# Patient Record
Sex: MALE | Race: WHITE | Employment: FULL TIME | URBAN - METROPOLITAN AREA
[De-identification: names, ages, dates, MRNs, and addresses within clinical notes are randomized per-mention and may not be internally consistent; named-entity substitution may affect disease eponyms.]

---

## 2017-06-14 ENCOUNTER — OFFICE VISIT (OUTPATIENT)
Dept: ENDOCRINOLOGY | Age: 32
End: 2017-06-14

## 2017-06-14 VITALS
HEIGHT: 66 IN | DIASTOLIC BLOOD PRESSURE: 91 MMHG | HEART RATE: 89 BPM | WEIGHT: 142.6 LBS | TEMPERATURE: 96.7 F | RESPIRATION RATE: 16 BRPM | BODY MASS INDEX: 22.92 KG/M2 | OXYGEN SATURATION: 99 % | SYSTOLIC BLOOD PRESSURE: 120 MMHG

## 2017-06-14 DIAGNOSIS — Z79.4 TYPE 2 DIABETES MELLITUS WITH HYPERGLYCEMIA, WITH LONG-TERM CURRENT USE OF INSULIN (HCC): ICD-10-CM

## 2017-06-14 DIAGNOSIS — E11.65 UNCONTROLLED TYPE 2 DIABETES MELLITUS WITH HYPERGLYCEMIA, WITH LONG-TERM CURRENT USE OF INSULIN (HCC): ICD-10-CM

## 2017-06-14 DIAGNOSIS — E78.00 HYPERCHOLESTEREMIA: ICD-10-CM

## 2017-06-14 DIAGNOSIS — I10 ESSENTIAL HYPERTENSION: ICD-10-CM

## 2017-06-14 DIAGNOSIS — Z79.4 UNCONTROLLED TYPE 2 DIABETES MELLITUS WITH HYPERGLYCEMIA, WITH LONG-TERM CURRENT USE OF INSULIN (HCC): ICD-10-CM

## 2017-06-14 DIAGNOSIS — E11.65 TYPE 2 DIABETES MELLITUS WITH HYPERGLYCEMIA, WITH LONG-TERM CURRENT USE OF INSULIN (HCC): Primary | ICD-10-CM

## 2017-06-14 DIAGNOSIS — Z79.4 TYPE 2 DIABETES MELLITUS WITH HYPERGLYCEMIA, WITH LONG-TERM CURRENT USE OF INSULIN (HCC): Primary | ICD-10-CM

## 2017-06-14 DIAGNOSIS — E11.65 TYPE 2 DIABETES MELLITUS WITH HYPERGLYCEMIA, WITH LONG-TERM CURRENT USE OF INSULIN (HCC): ICD-10-CM

## 2017-06-14 LAB — HBA1C MFR BLD HPLC: 10.4 %

## 2017-06-14 RX ORDER — LISINOPRIL 10 MG/1
10 TABLET ORAL DAILY
Qty: 90 TAB | Refills: 3 | Status: SHIPPED | OUTPATIENT
Start: 2017-06-14 | End: 2017-09-28 | Stop reason: SDUPTHER

## 2017-06-14 RX ORDER — PRAVASTATIN SODIUM 10 MG/1
10 TABLET ORAL
Qty: 30 TAB | Refills: 5 | Status: SHIPPED | OUTPATIENT
Start: 2017-06-14 | End: 2017-09-28 | Stop reason: SDUPTHER

## 2017-06-14 RX ORDER — METFORMIN HYDROCHLORIDE 1000 MG/1
1000 TABLET ORAL 2 TIMES DAILY WITH MEALS
Qty: 60 TAB | Refills: 5 | Status: SHIPPED | OUTPATIENT
Start: 2017-06-14 | End: 2017-09-28 | Stop reason: SDUPTHER

## 2017-06-14 NOTE — MR AVS SNAPSHOT
Visit Information Date & Time Provider Department Dept. Phone Encounter #  
 6/14/2017  8:45 AM Trixie Sanders MD [facility] [phone] [id number] Follow-up Instructions Return in about 3 months (around 9/14/2017). Upcoming Health Maintenance Date Due  
 EYE EXAM RETINAL OR DILATED Q1 10/29/1993 Pneumococcal 19-64 Medium Risk (1 of 1 - PPSV23) 10/29/2002 DTaP/Tdap/Td series (1 - Tdap) 10/29/2004 FOOT EXAM Q1 2/25/2016 MICROALBUMIN Q1 2/25/2016 LIPID PANEL Q1 2/25/2016 HEMOGLOBIN A1C Q6M 4/17/2017 INFLUENZA AGE 9 TO ADULT 8/1/2017 Allergies as of 6/14/2017  Review Complete On: 6/14/2017 By: Trixie Sanders MD  
 No Known Allergies Current Immunizations  Never Reviewed No immunizations on file. Not reviewed this visit You Were Diagnosed With   
  
 Codes Comments Type 2 diabetes mellitus with hyperglycemia, with long-term current use of insulin (HCC)    -  Primary ICD-10-CM: E11.65, Z79.4 ICD-9-CM: 250.00, 790.29, V58.67 Hypercholesteremia     ICD-10-CM: E78.00 ICD-9-CM: 272.0 Essential hypertension     ICD-10-CM: I10 
ICD-9-CM: 401.9 Vitals BP Pulse Temp Resp Height(growth percentile) Weight(growth percentile) (!) 120/91 (BP 1 Location: Left arm, BP Patient Position: Sitting) 89 96.7 °F (35.9 °C) (Oral) 16 5' 6\" (1.676 m) 142 lb 9.6 oz (64.7 kg) SpO2 BMI Smoking Status 99% 23.02 kg/m2 Never Smoker BMI and BSA Data Body Mass Index Body Surface Area 23.02 kg/m 2 1.74 m 2 Preferred Pharmacy Pharmacy Name Phone [facility] [address] [place] [address] [phone] Your Updated Medication List  
  
   
This list is accurate as of: 6/14/17  9:31 AM.  Always use your most recent med list.  
  
  
  
  
 glucose tablet Take 1 Tab by mouth as needed. insulin  unit/mL injection Commonly known as:  NovoLIN N  
 INJECT 30 UNITS IN THE MORNING AND 20 UNITS AT 9 PM  
  
 insulin regular 100 unit/mL injection Commonly known as:  NovoLIN R  
INJECT 8 UNITS BEFORE DINNER WITH SSI MAX UNITS DAILY: 53  
  
 insulin syringe-needle U-100 1/2 mL 31 gauge x 15/64\" Syrg Commonly known as:  BD INSULIN SYRINGE ULTRA-FINE  
1 Syringe by Does Not Apply route two (2) times a day. lisinopril 2.5 mg tablet Commonly known as:  Melissa Feil Take 1 Tab by mouth daily. metFORMIN 1,000 mg tablet Commonly known as:  GLUCOPHAGE Take 1 Tab by mouth two (2) times daily (with meals). pravastatin 10 mg tablet Commonly known as:  PRAVACHOL Take 1 Tab by mouth nightly. We Performed the Following AMB POC HEMOGLOBIN A1C [67240 CPT(R)] Follow-up Instructions Return in about 3 months (around 9/14/2017). Introducing [facility]! [employer] introduces H[employer] patient portal. Now you can access parts of your medical record, email your doctor's office, and request medication refills online. 1. In your internet browser, go to [email or web address] 2. Click on the First Time User? Click Here link in the Sign In box. You will see the New Member Sign Up page. 3. Enter your HackerEarth Access Code exactly as it appears below. You will not need to use this code after youve completed the sign-up process. If you do not sign up before the expiration date, you must request a new code. · Hacker[employer] Access Code: [email or web address] Expires: 9/12/2017  9:31 AM 
 
4. Enter the last four digits of your Social Security Number (xxxx) and Date of Birth (mm/dd/yyyy) as indicated and click Submit. You will be taken to the next sign-up page. 5. Create a Setera Communicationst ID. This will be your HackerEarth login ID and cannot be changed, so think of one that is secure and easy to remember. 6. Create a [employer] password. You can change your password at any time. 7. Enter your Password Reset Question and Answer. This can be used at a later time if you forget your password. 8. Enter your e-mail address. You will receive e-mail notification when new information is available in [address] 9. Click Sign Up. You can now view and download portions of your medical record. 10. Click the Download Summary menu link to download a portable copy of your medical information. If you have questions, please visit the Frequently Asked Questions section of the deltamethod website. Remember, deltamethod is NOT to be used for urgent needs. For medical emergencies, dial 911. Now available from your iPhone and Android! Please provide this summary of care documentation to your next provider. Your primary care clinician is listed as NONE. If you have any questions after today's visit, please call [phone].

## 2017-06-14 NOTE — PROGRESS NOTES
Reyes Arce is a 33 y.o. male here for   Chief Complaint   Patient presents with    Diabetes    Cholesterol Problem    Hypertension       Functional glucose monitor and record keeping system? - yes   Eye exam within last year? - no  Foot exam within last year? - no    1. Have you been to the ER, urgent care clinic since your last visit? Hospitalized since your last visit? -no    2. Have you seen or consulted any other health care providers outside of the [facility] since your last visit?   Include any pap smears or colon screening.- no      Lab Results   Component Value Date/Time    Hemoglobin A1c 12.3 02/25/2015 10:26 AM    Hemoglobin A1c (POC) 11.5 10/17/2016 08:46 AM       Wt Readings from Last 3 Encounters:   10/17/16 141 lb (64 kg)   08/23/16 165 lb (74.8 kg)   07/13/15 140 lb (63.5 kg)     Temp Readings from Last 3 Encounters:   10/17/16 97.8 °F (36.6 °C) (Oral)   08/23/16 98 °F (36.7 °C)   07/13/15 98.6 °F (37 °C)     BP Readings from Last 3 Encounters:   10/17/16 135/87   08/23/16 101/58   07/13/15 115/71     Pulse Readings from Last 3 Encounters:   10/17/16 94   08/23/16 93   07/13/15 66

## 2017-09-12 ENCOUNTER — HOSPITAL ENCOUNTER (EMERGENCY)
Age: 32
Discharge: HOME | End: 2017-09-12
Attending: EMERGENCY MEDICINE
Payer: SUBSIDIZED

## 2017-09-12 ENCOUNTER — APPOINTMENT (OUTPATIENT)
Dept: GENERAL RADIOLOGY | Age: 32
End: 2017-09-12
Attending: EMERGENCY MEDICINE
Payer: SUBSIDIZED

## 2017-09-12 VITALS
BODY MASS INDEX: 23.74 KG/M2 | WEIGHT: 147.71 LBS | RESPIRATION RATE: 16 BRPM | SYSTOLIC BLOOD PRESSURE: 139 MMHG | TEMPERATURE: 98 F | HEART RATE: 94 BPM | DIASTOLIC BLOOD PRESSURE: 97 MMHG | HEIGHT: 66 IN | OXYGEN SATURATION: 100 %

## 2017-09-12 DIAGNOSIS — M79.605 LEG PAIN, BILATERAL: Primary | ICD-10-CM

## 2017-09-12 DIAGNOSIS — M79.604 LEG PAIN, BILATERAL: Primary | ICD-10-CM

## 2017-09-12 PROCEDURE — 73590 X-RAY EXAM OF LOWER LEG: CPT

## 2017-09-12 PROCEDURE — 99282 EMERGENCY DEPT VISIT SF MDM: CPT

## 2017-09-12 RX ORDER — CYCLOBENZAPRINE HCL 10 MG
10 TABLET ORAL
Qty: 20 TAB | Refills: 0 | Status: SHIPPED | OUTPATIENT
Start: 2017-09-12

## 2017-09-12 NOTE — ED PROVIDER NOTES
HPI Comments: 33 y.o. male with past medical history significant for DM who presents from home with chief complaint of B/L LE pain. The pt c/o B/L LE pain  that started 3 months ago without injury. The pain worsens at night and presents as bone pain and a calf cramping sensation. The pt has been drinking fluids. There are no other acute medical concerns at this time. Social hx: nonsmoker, no EtOH use  PCP: None  Note written by Jen Crum, as dictated by Valery Paula MD 12:50 PM        The history is provided by the patient. No  was used. Past Medical History:   Diagnosis Date    [facility] ([facility]     Type 2 diabetes mellitus ([facility]        No past surgical history on file. No family history on file. Social History     Social History    Marital status:      Spouse name: N/A    Number of children: N/A    Years of education: N/A     Occupational History    Not on file. Social History Main Topics    Smoking status: Never Smoker    Smokeless tobacco: Not on file    Alcohol use Yes    Drug use: Not on file    Sexual activity: Not on file     Other Topics Concern    Not on file     Social History Narrative    ** Merged History Encounter **              ALLERGIES: Review of patient's allergies indicates no known allergies. Review of Systems   Constitutional: Negative. Negative for appetite change, fever and unexpected weight change. HENT: Negative. Negative for ear pain, hearing loss, nosebleeds, rhinorrhea, sore throat and trouble swallowing. Respiratory: Negative. Negative for cough, chest tightness and shortness of breath. Cardiovascular: Negative. Negative for chest pain and palpitations. Gastrointestinal: Negative. Negative for abdominal distention, abdominal pain, blood in stool and vomiting. Endocrine: Negative. Genitourinary: Negative for dysuria and hematuria. Musculoskeletal: Negative.   Negative for back pain and myalgias. B/L LE pain   Skin: Negative. Negative for rash. Allergic/Immunologic: Negative. Neurological: Negative. Negative for dizziness, syncope, weakness and numbness. Hematological: Negative. Psychiatric/Behavioral: Negative. All other systems reviewed and are negative. Vitals:    09/12/17 1052   BP: (!) 139/97   Pulse: 94   Resp: 16   Temp: 98 °F (36.7 °C)   SpO2: 100%   Weight: 67 kg (147 lb 11.3 oz)   Height: 5' 6\" (1.676 m)            Physical Exam   Constitutional: He is oriented to person, place, and time. He appears well-developed and well-nourished. No distress. HENT:   Head: Normocephalic and atraumatic. Right Ear: External ear normal.   Left Ear: External ear normal.   Nose: Nose normal.   Mouth/Throat: Oropharynx is clear and moist.   Eyes: Conjunctivae and EOM are normal. Pupils are equal, round, and reactive to light. Neck: Normal range of motion. Neck supple. No JVD present. No thyromegaly present. Cardiovascular: Normal rate, regular rhythm, normal heart sounds and intact distal pulses. No murmur heard. Pulmonary/Chest: Effort normal and breath sounds normal. No respiratory distress. He has no wheezes. He has no rales. Abdominal: Soft. Bowel sounds are normal. He exhibits no distension. There is no tenderness. Musculoskeletal: Normal range of motion. He exhibits no edema. B/L calf discomfort with palpation without swelling, erythema, or weakness;  neurovascularly intact; Bone structure grossly intact; No joint tenderness or swelling;   Neurological: He is alert and oriented to person, place, and time. No cranial nerve deficit. Skin: Skin is warm and dry. No rash noted. Psychiatric: He has a normal mood and affect. His behavior is normal. Thought content normal.   Nursing note and vitals reviewed.    Note written by Riya OfficerJen, as dictated by Darryn Horner MD 12:51 PM      MDM  Number of Diagnoses or Management Options  Leg pain, bilateral:   Diagnosis management comments: Assessment: leg cramps. Follow up with [facility]    ED Course       Procedures    1:02 PM   L and R tibfib xrays are negative. Will send home with Flexeril.

## 2017-09-12 NOTE — DISCHARGE INSTRUCTIONS
We hope that we have addressed all of your medical concerns. The examination and treatment you received in the Emergency Department were for an emergent problem and were not intended as complete care. It is important that you follow up with your healthcare provider(s) for ongoing care. If your symptoms worsen or do not improve as expected, and you are unable to reach your usual health care provider(s), you should return to the Emergency Department. Today's healthcare is undergoing tremendous change, and patient satisfaction surveys are one of the many tools to assess the quality of medical care. You may receive a survey from the [employer] regarding your experience in the Emergency Department. I hope that your experience has been completely positive, particularly the medical care that I provided. As such, please participate in the survey; anything less than excellent does not meet my expectations or intentions. Formerly [facility] and [address] [place] [address] participate in nationally recognized quality of care measures. If your blood pressure is greater than 120/80, as reported below, we urge that you seek medical care to address the potential of high blood pressure, commonly known as hypertension. Hypertension can be hereditary or can be caused by certain medical conditions, pain, stress, or \"white coat syndrome. \"       Please make an appointment with your health care provider(s) for follow up of your Emergency Department visit. VITALS:   Patient Vitals for the past 8 hrs:   Temp Pulse Resp BP SpO2   09/12/17 1052 98 °F (36.7 °C) 94 16 (!) 139/97 100 %          Thank you for allowing us to provide you with medical care today. We realize that you have many choices for your emergency care needs. Please choose us in the future for any continued health care needs. Regards,           Angel Alexandre, [address] [facility].   Office: [phone]            No results found for this or any previous visit (from the past 24 hour(s)). Xr Tib/fib Lt    Result Date: 9/12/2017  EXAM:  XR TIB/FIB LT INDICATION: Bilateral calf pain for 3 months that is worse at night. COMPARISON: None. FINDINGS: AP and lateral  views of the left tibia and fibula demonstrate no fracture or other acute osseous, articular or soft tissue abnormality. IMPRESSION: No acute abnormality. Xr Tib/fib Rt    Result Date: 9/12/2017  EXAM:  XR TIB/FIB RT INDICATION:  Bilateral calf pain for 3 months that is worse at night. COMPARISON: None. FINDINGS: AP and lateral  views of the right tibia and fibula demonstrate no fracture or other acute osseous, articular or soft tissue abnormality. IMPRESSION: No acute abnormality.

## 2017-09-28 ENCOUNTER — OFFICE VISIT (OUTPATIENT)
Dept: ENDOCRINOLOGY | Age: 32
End: 2017-09-28

## 2017-09-28 VITALS
BODY MASS INDEX: 23.56 KG/M2 | HEART RATE: 92 BPM | TEMPERATURE: 97.4 F | SYSTOLIC BLOOD PRESSURE: 125 MMHG | DIASTOLIC BLOOD PRESSURE: 85 MMHG | RESPIRATION RATE: 16 BRPM | OXYGEN SATURATION: 100 % | HEIGHT: 66 IN | WEIGHT: 146.6 LBS

## 2017-09-28 DIAGNOSIS — Z79.4 UNCONTROLLED TYPE 2 DIABETES MELLITUS WITH HYPERGLYCEMIA, WITH LONG-TERM CURRENT USE OF INSULIN (HCC): ICD-10-CM

## 2017-09-28 DIAGNOSIS — E11.65 TYPE 2 DIABETES MELLITUS WITH HYPERGLYCEMIA, WITH LONG-TERM CURRENT USE OF INSULIN (HCC): Primary | ICD-10-CM

## 2017-09-28 DIAGNOSIS — I10 ESSENTIAL HYPERTENSION: ICD-10-CM

## 2017-09-28 DIAGNOSIS — E11.49 DIABETIC NEUROPATHY WITH NEUROLOGIC COMPLICATION (HCC): ICD-10-CM

## 2017-09-28 DIAGNOSIS — E11.65 TYPE 2 DIABETES MELLITUS WITH HYPERGLYCEMIA, WITH LONG-TERM CURRENT USE OF INSULIN (HCC): ICD-10-CM

## 2017-09-28 DIAGNOSIS — E78.00 HYPERCHOLESTEREMIA: ICD-10-CM

## 2017-09-28 DIAGNOSIS — Z79.4 TYPE 2 DIABETES MELLITUS WITH HYPERGLYCEMIA, WITH LONG-TERM CURRENT USE OF INSULIN (HCC): ICD-10-CM

## 2017-09-28 DIAGNOSIS — Z79.4 TYPE 2 DIABETES MELLITUS WITH HYPERGLYCEMIA, WITH LONG-TERM CURRENT USE OF INSULIN (HCC): Primary | ICD-10-CM

## 2017-09-28 DIAGNOSIS — E11.40 DIABETIC NEUROPATHY WITH NEUROLOGIC COMPLICATION (HCC): ICD-10-CM

## 2017-09-28 DIAGNOSIS — E11.65 UNCONTROLLED TYPE 2 DIABETES MELLITUS WITH HYPERGLYCEMIA, WITH LONG-TERM CURRENT USE OF INSULIN (HCC): ICD-10-CM

## 2017-09-28 LAB — HBA1C MFR BLD HPLC: 9.7 %

## 2017-09-28 RX ORDER — LISINOPRIL 10 MG/1
10 TABLET ORAL DAILY
Qty: 90 TAB | Refills: 3 | Status: SHIPPED | OUTPATIENT
Start: 2017-09-28

## 2017-09-28 RX ORDER — PRAVASTATIN SODIUM 10 MG/1
10 TABLET ORAL
Qty: 90 TAB | Refills: 3 | Status: SHIPPED | OUTPATIENT
Start: 2017-09-28

## 2017-09-28 RX ORDER — GABAPENTIN 300 MG/1
300 CAPSULE ORAL
Qty: 90 CAP | Refills: 3 | Status: SHIPPED | OUTPATIENT
Start: 2017-09-28

## 2017-09-28 RX ORDER — METFORMIN HYDROCHLORIDE 1000 MG/1
1000 TABLET ORAL 2 TIMES DAILY WITH MEALS
Qty: 180 TAB | Refills: 3 | Status: SHIPPED | OUTPATIENT
Start: 2017-09-28 | End: 2019-10-02 | Stop reason: SDUPTHER

## 2017-09-28 NOTE — PATIENT INSTRUCTIONS
Check blood sugars before breakfast and dinner,bedtime    If the bedtime sugars are less than 100 ,eat a 15 gm snack. Weight and diet control. Continue Metformin    Novolin N cloudy insulin 30 units in AM , 20 units at  PM    Novolin R clear insulin ( fast acting) 8 units before breakfast and dinner. Novolin R as needed if glucose is    Blood sugar  Breakfast/Lunch/Dinner        130-200  Add  3  Units       201-250  Add  6 Units       251-300  Add  9 Units       301-350  Add 12  Units        351-400  Add 15  Units       Continue metformin       Dolor neuropático: Instrucciones de cuidado - [ Neuropathic Pain: Care Instructions ]  Instrucciones de cuidado  El dolor neuropático es causado por presión o daño en los nervios. A menudo se lo llama simplemente dolor nervioso. Algunas personas sienten michael tipo de dolor todo [place]. Para otras, aparece y desaparece. La diabetes, la culebrilla o lynda lesión pueden causar dolor nervioso. Muchas personas dicen que el dolor que sienten es tiffanie, ardiente o punzante. Joseluis algunas personas lo sienten alvarado un dolor sordo. En algunos casos, puede hacer que benito piel esté muy sensible. De modo que el hecho de que lo toquen, presión y otras sensaciones que antes no le dolían ahora sí le duelen. Es importante que sepa que esta clase de dolor es real y puede afectar benito calidad de elliot. Es Sangeeta Sorrow importante que sepa que se puede ayudar con tratamiento. El tratamiento incluye analgésicos (medicamentos para el dolor), ejercicio y fisioterapia. Los medicamentos pueden ayudar a reducir la cantidad de señales de dolor que recorren los nervios. [place] puede hacer que las zonas doloridas caleb menos sensibles. También pueden ayudarle a dormir mejor y a mejorar benito estado de ánimo. Joseluis los medicamentos son solamente lynda parte de un tratamiento exitoso. La mayoría de las personas se benefician con más de lynda clase de [place].  Benito médico podría recomendarle que pruebe la terapia cognitivo-conductual y el manejo del estrés. O, si es necesario, usted podría optar por tratar de dejar de fumar, reducir benito presión arterial o controlar mejor henrietta niveles de azúcar en la carleen. Estas clases de cambios saludables también pueden tener un [place]. Si usted deep que benito tratamiento no está dando resultado, hable con benito médico. Y asegúrese de decirle a benito médico si piensa que pudiera estar deprimido o ansioso. Estos son problemas comunes que también pueden tratarse. La atención de seguimiento es lynda parte clave de benito tratamiento y seguridad. Asegúrese de hacer y acudir a todas las citas, y llame a benito médico si está teniendo problemas. También es lynda buena idea saber los resultados de los exámenes y mantener lynda lista de los medicamentos que bhavna. Cómo puede cuidarse en el hogar? · Sea jonathan con los medicamentos. Ruby y siga todas las instrucciones de la [place]. ¨ Si el médico le recetó un analgésico (medicamento para el dolor), tómelo según las indicaciones. ¨ Si no está tomando un analgésico recetado, pregúntele al médico si puede josef adrienne de [employer]. · Deje las tareas difíciles para los días en que tenga menos dolor. Alterne las tareas difíciles con tareas fáciles. Y recuerde josef descansos. · Relájese y reduzca el estrés. Karthikeyan vez quiera probar la respiración profunda o la meditación. Estas pueden ayudar. · Manténgase en movimiento. El ejercicio suave a diario puede ayudar a reducir el dolor. Benito médico o fisioterapeuta pueden decirle qué tipo de ejercicio le conviene a usted. [place] puede incluir caminar, nadar y usar lynda [place]. También puede incluir estiramientos y ejercicios de amplitud de [place]. · Pruebe con calor, compresas frías y masajes. · Duerma lo suficiente. El dolor cece puede hacerle sentir más cansado. Si el dolor le da dificultades para dormir, hable con benito médico.  · Piense de [place] positiva. Henrietta pensamientos pueden afectar benito dolor.  Pattie Cohen divertidas que lo distraigan del dolor. Cherelle lynda película, ruby un libro, escuche música o pase tiempo con un amigo. · Mantenga un diario de benito dolor. Trate de anotar la intensidad de benito dolor y cómo se siente. También trate de estar atento y anotar de qué manera christian estados de ánimo, pensamientos, sueño, actividades y medicamentos afectan el dolor. Estas notas pueden ayudarles a usted y a benito médico a encontrar las mejores maneras de tratar benito dolor. Cómo reducir el estreñimiento causado por los analgésicos  Los analgésicos con frecuencia causan estreñimiento. Para reducir el estreñimiento:  · Incluya en benito alimentación frutas, verduras, frijoles (habichuelas) y granos integrales todos los días. Estos alimentos son ricos en fibra. · Carmela abundantes líquidos, los suficientes alvarado para que benito orina sea de color amarillo kelin o transparente alvarado el agua. Si tiene [employer] [facility] [employer], del corazón o del hígado y tiene que [place]'s líquidos, hable con benito médico antes de aumentar benito consumo. · Gris algo de ejercicio todos los ana. Aumente el tiempo en forma gradual hasta entre 30 y 61 minutos al día, abigail 5 o más días a la semana. · Si es necesario, tome un suplemento de Erica, alvarado [employer] o Metamucil, todos los [address] [place] y siga todas las indicaciones de la [place]. · Fije un horario todos los días para evacuar el intestino. Lynda rutina diaria puede ayudar. Tómese benito tiempo y no se esfuerce cuando esté evacuando. · Pregúntele a benito médico si debe josef un laxante. El objetivo es tener lynda evacuación del intestino sin problemas cada 1 o 2 días. No espere más de 3 días para tratar el estreñimiento. Cuándo debe pedir ayuda? Llame a benito médico ahora mismo o busque atención médica inmediata si:  · Se siente clair, ansioso o desesperanzado por más de [address] [facility]. [place] podría significar que está deprimido. La depresión es común [employer] personas que tienen mucho dolor. Joseluis puede tratarse.   · Tiene problemas con la evacuación intestinal, tales alvarado:  ¨ No ha tenido evacuaciones intestinales en 3 días. ¨ Hay carleen en la lawanda anal, las heces o el papel higiénico.  ¨ Tiene diarrea por más de 24 horas. Preste especial atención a los cambios en benito alcon y asegúrese de comunicarse con benito médico si:  · Benito dolor empeora. · No puede dormir debido al dolor. · Se siente muy preocupado o ansioso por benito dolor. · Tiene problemas para josef christian analgésicos. · Tiene inquietudes con respecto al analgésico o a christian efectos secundarios. · Tiene vómito o retortijones por más de 2 horas. Dónde puede encontrar más información en inglés? Caty Spencer a http://tera-barbara.info/. Homar De Souza Y679 en la búsqueda para aprender más acerca de \"Dolor neuropático: Instrucciones de cuidado - [ Neuropathic Pain: Care Instructions ]. \"  Revisado: 14 octubre, 2016  Versión del contenido: 11.3  © 1558-7231 Healthwise, Incorporated. Las instrucciones de cuidado fueron adaptadas bajo licencia por [employer] (which disclaims liability or warranty for this information). Si usted tiene [place] afección médica o sobre estas instrucciones, siempre pregunte a benito profesional de alcon. Healthwise, Incorporated niega toda garantía o responsabilidad por benito uso de esta información.

## 2017-09-28 NOTE — PROGRESS NOTES
Luzmaria Kirkland is a 33 y.o. male here for   Chief Complaint   Patient presents with    Diabetes       Functional glucose monitor and record keeping system? - yes, sometimes  Eye exam within last year? - no  Foot exam within last year? - Sept 2017     1. Have you been to the ER, urgent care clinic since your last visit? Hospitalized since your last visit? -[facility] for leg pains on 9/12/17 (in [place])    2. Have you seen or consulted any other health care providers outside of the [address] [place] [address] since your last visit?   Include any pap smears or colon screening.-no      Lab Results   Component Value Date/Time    Hemoglobin A1c 12.3 02/25/2015 10:26 AM    Hemoglobin A1c (POC) 10.4 06/14/2017 08:52 AM       Wt Readings from Last 3 Encounters:   09/12/17 147 lb 11.3 oz (67 kg)   06/14/17 142 lb 9.6 oz (64.7 kg)   10/17/16 141 lb (64 kg)     Temp Readings from Last 3 Encounters:   09/12/17 98 °F (36.7 °C)   06/14/17 96.7 °F (35.9 °C) (Oral)   10/17/16 97.8 °F (36.6 °C) (Oral)     BP Readings from Last 3 Encounters:   09/12/17 (!) 139/97   06/14/17 (!) 120/91   10/17/16 135/87     Pulse Readings from Last 3 Encounters:   09/12/17 94   06/14/17 89   10/17/16 94

## 2017-09-28 NOTE — PROGRESS NOTES
Marycarmen Araya MD          Patient Information  Date:9/28/2017  Name : Rom Vargas 33 y.o.     YOB: 1985         Referred by: Maverick Rosas NP         Chief Complaint   Patient presents with    Diabetes       History of Present Illness: Rom Vargas is a 33 y.o. male here for follow up of  Type 2 Diabetes Mellitus. He went to ER with calf cramping , also has tingling , pins and needles  Stopped ETOH recently   He is requesting flexeril which was given in ED    No log      He has no blurring of his vision. Has a strong family history of type 2 diabetes. Wt Readings from Last 3 Encounters:   09/28/17 146 lb 9.6 oz (66.5 kg)   09/12/17 147 lb 11.3 oz (67 kg)   06/14/17 142 lb 9.6 oz (64.7 kg)       BP Readings from Last 3 Encounters:   09/28/17 125/85   09/12/17 (!) 139/97   06/14/17 (!) 120/91           Past Medical History:   Diagnosis Date    Diabetes ([facility] [address]     Type 2 diabetes mellitus (HCC)      Current Outpatient Prescriptions   Medication Sig    cyclobenzaprine (FLEXERIL) 10 mg tablet Take 1 Tab by mouth nightly.  insulin regular (NOVOLIN R) 100 unit/mL injection INJECT 8 UNITS BEFORE DINNER WITH SSI MAX UNITS DAILY: 53    insulin NPH (NOVOLIN N) 100 unit/mL injection INJECT 30 UNITS IN THE MORNING AND 20 UNITS AT 9 PM    insulin syringe-needle U-100 (BD INSULIN SYRINGE ULTRA-FINE) 1/2 mL 31 x 15/64\" syrg 1 Syringe by Does Not Apply route two (2) times a day.  pravastatin (PRAVACHOL) 10 mg tablet Take 1 Tab by mouth nightly.  lisinopril (PRINIVIL, ZESTRIL) 10 mg tablet Take 1 Tab by mouth daily.  gabapentin (NEURONTIN) 300 mg capsule Take 1 Cap by mouth nightly.  metFORMIN (GLUCOPHAGE) 1,000 mg tablet Take 1 Tab by mouth two (2) times daily (with meals).  glucose tablet Take 1 Tab by mouth as needed. No current facility-administered medications for this visit.       No Known Allergies      Review of Systems:  - Constitutional Symptoms: no fevers, no chills  - Eyes: no blurry vision no double vision  - Cardiovascular: no chest pain ,no palpitations  - Respiratory: no cough no shortness of breath  - Integumentary: no rashes  - Neurological: no numbness, tingling, no  headaches  -     Physical Examination:   Blood pressure 125/85, pulse 92, temperature 97.4 °F (36.3 °C), temperature source Oral, resp. rate 16, height 5' 6\" (1.676 m), weight 146 lb 9.6 oz (66.5 kg), SpO2 100 %. Estimated body mass index is 23.66 kg/(m^2) as calculated from the following:    Height as of this encounter: 5' 6\" (1.676 m). -   Weight as of this encounter: 146 lb 9.6 oz (66.5 kg). - General: pleasant, no distress, good eye contact  - HEENT: no pallor, no periorbital edema, EOMI  - Neck: supple, no thyromegaly  - Cardiovascular: regular, normal rate, normal S1 and S2  - Respiratory: clear to auscultation bilaterally  - Gastrointestinal: soft, nontender, nondistended,  BS +  - Musculoskeletal: :no edema,   - Neurological:alert and oriented  - Psychiatric: normal mood and affect  - Skin: color, texture, turgor normal.       Data Reviewed:     [] Glucose records reviewed. [] See glucose records for details (to be scanned). [] A1C  [x] Reviewed labs    Lab Results   Component Value Date/Time    Hemoglobin A1c 12.3 02/25/2015 10:26 AM    Hemoglobin A1c 12.8 05/06/2014 01:50 PM    Glucose 256 08/23/2016 10:12 AM    Glucose (POC) 184 08/23/2016 11:45 AM    Glucose  10/17/2016 08:46 AM    Microalbumin/Creat ratio (mg/g creat) 45 05/06/2014 09:30 PM    Microalbumin,urine random 2.22 05/06/2014 09:30 PM    LDL, calculated 144 02/25/2015 10:26 AM    Creatinine 0.53 08/23/2016 10:12 AM      Lab Results   Component Value Date/Time    ALT (SGPT) 23 02/25/2015 10:26 AM    AST (SGOT) 13 02/25/2015 10:26 AM    Alk.  phosphatase 76 02/25/2015 10:26 AM    Bilirubin, total 0.7 02/25/2015 10:26 AM     Lab Results Component Value Date/Time    GFR est AA >60 08/23/2016 10:12 AM    GFR est non-AA >60 08/23/2016 10:12 AM    Creatinine 0.53 08/23/2016 10:12 AM    BUN 13 08/23/2016 10:12 AM    Sodium 138 08/23/2016 10:12 AM    Potassium 3.8 08/23/2016 10:12 AM    Chloride 100 08/23/2016 10:12 AM    CO2 24 08/23/2016 10:12 AM           Assessment/Plan:     1. Type 2 Diabetes Mellitus ,uncontrolled  Lab Results   Component Value Date/Time    Hemoglobin A1c 12.3 02/25/2015 10:26 AM    Hemoglobin A1c (POC) 9.7 09/28/2017 09:19 AM    Improved     NPH 30 units in the morning and 20 units at bedtime  Novolin R 8 units before Breakfast and dinner,cost is playing a role  Continue metformin  Advised to check glucose 2  times daily  Annual eye exam,microalbumin    2. HTN : Continue current therapy     3. Hyperlipidemia - statin    4 Neuropathy - due to ETOH + DM   Need labs and since he has no insurance he is hesitating to get labs   MVI ,banana, yogurt , gabapentin   Stop Flexeril     Thank you for allowing me to participate in the care of this patient.     Alex Nur MD

## 2017-09-28 NOTE — MR AVS SNAPSHOT
Visit Information Date & Time Provider Department Dept. Phone Encounter #  
 9/28/2017  8:45 AM Gilma Scott MD [facility] [place] [facility] [phone] [id number] Follow-up Instructions Return in about 3 months (around 12/28/2017). Upcoming Health Maintenance Date Due  
 EYE EXAM RETINAL OR DILATED Q1 10/29/1993 Pneumococcal 19-64 Medium Risk (1 of 1 - PPSV23) 10/29/2002 DTaP/Tdap/Td series (1 - Tdap) 10/29/2004 FOOT EXAM Q1 2/25/2016 MICROALBUMIN Q1 2/25/2016 LIPID PANEL Q1 2/25/2016 INFLUENZA AGE 9 TO ADULT 8/1/2017 HEMOGLOBIN A1C Q6M 12/14/2017 Allergies as of 9/28/2017  Review Complete On: 9/28/2017 By: France John LPN No Known Allergies Current Immunizations  Never Reviewed No immunizations on file. Not reviewed this visit You Were Diagnosed With   
  
 Codes Comments Type 2 diabetes mellitus with hyperglycemia, with long-term current use of insulin (HCC)    -  Primary ICD-10-CM: E11.65, Z79.4 ICD-9-CM: 250.00, 790.29, V58.67 Hypercholesteremia     ICD-10-CM: E78.00 ICD-9-CM: 272.0 Essential hypertension     ICD-10-CM: I10 
ICD-9-CM: 401.9 Diabetic neuropathy with neurologic complication (HCC)     NYN-13-AV: E11.40, E11.49 
ICD-9-CM: 250.60, 357.2 Vitals BP Pulse Temp Resp Height(growth percentile) Weight(growth percentile) 125/85 (BP 1 Location: Left arm, BP Patient Position: Sitting) 92 97.4 °F (36.3 °C) (Oral) 16 5' 6\" (1.676 m) 146 lb 9.6 oz (66.5 kg) SpO2 BMI Smoking Status 100% 23.66 kg/m2 Never Smoker Vitals History BMI and BSA Data Body Mass Index Body Surface Area  
 23.66 kg/m 2 1.76 m 2 Preferred Pharmacy Pharmacy Name Phone [facility] [address] [place] [address] [place] [phone] Your Updated Medication List  
  
   
This list is accurate as of: 9/28/17  9:46 AM.  Always use your most recent med list.  
 cyclobenzaprine 10 mg tablet Commonly known as:  FLEXERIL Take 1 Tab by mouth nightly. glucose tablet Take 1 Tab by mouth as needed. insulin  unit/mL injection Commonly known as:  NovoLIN N  
INJECT 30 UNITS IN THE MORNING AND 20 UNITS AT 9 PM  
  
 insulin regular 100 unit/mL injection Commonly known as:  NovoLIN R  
INJECT 8 UNITS BEFORE DINNER WITH SSI MAX UNITS DAILY: 53  
  
 insulin syringe-needle U-100 1/2 mL 31 gauge x 15/64\" Syrg Commonly known as:  BD INSULIN SYRINGE ULTRA-FINE  
1 Syringe by Does Not Apply route two (2) times a day. lisinopril 10 mg tablet Commonly known as:  Valene Pencil Take 1 Tab by mouth daily. metFORMIN 1,000 mg tablet Commonly known as:  GLUCOPHAGE Take 1 Tab by mouth two (2) times daily (with meals). pravastatin 10 mg tablet Commonly known as:  PRAVACHOL Take 1 Tab by mouth nightly. We Performed the Following AMB POC HEMOGLOBIN A1C [68145 CPT(R)] Follow-up Instructions Return in about 3 months (around 12/28/2017). To-Do List   
 09/28/2017 Lab:  CBC WITH AUTOMATED DIFF   
  
 09/28/2017 Lab:  METABOLIC PANEL, BASIC Patient Instructions Check blood sugars before breakfast and dinner,bedtime If the bedtime sugars are less than 100 ,eat a 15 gm snack. Weight and diet control. Continue Metformin Novolin N cloudy insulin 30 units in AM , 20 units at  PM 
 
Novolin R clear insulin ( fast acting) 8 units before breakfast and dinner. Novolin R as needed if glucose is Blood sugar  Breakfast/Lunch/Dinner 130-200  Add  3  Units 201-250  Add  6 Units 251-300  Add  9 Units 301-350  Add 12  Units 351-400  Add 15  Units Continue metformin Dolor neuropático: Instrucciones de cuidado - [ Neuropathic Pain: Care Instructions ] Instrucciones de cuidado El dolor neuropático es causado por presión o daño en los nervios. A menudo se lo llama simplemente dolor nervioso. Algunas personas sienten michael tipo de dolor todo [place]. Para otras, aparece y desaparece. La diabetes, la culebrilla o lynda lesión pueden causar dolor nervioso. Muchas personas dicen que el dolor que sienten es tiffanie, ardiente o punzante. Joseluis algunas personas lo sienten alvarado un dolor sordo. En algunos casos, puede hacer que benito piel esté muy sensible. De modo que el hecho de que lo toquen, presión y otras sensaciones que antes no le dolían ahora sí le duelen. Es importante que sepa que esta clase de dolor es real y puede afectar benito calidad de elliot. Es [place] importante que sepa que se puede ayudar con tratamiento. El tratamiento incluye analgésicos (medicamentos para el dolor), ejercicio y fisioterapia. Los medicamentos pueden ayudar a reducir la cantidad de señales de dolor que recorren los nervios. Hamer puede hacer que las zonas doloridas caleb menos sensibles. También pueden ayudarle a dormir mejor y a mejorar benito estado de ánimo. Joseluis los medicamentos son solamente lynda parte de un tratamiento exitoso. La mayoría de las personas se benefician con más de lynda clase de [place]. Benito médico podría recomendarle que pruebe la terapia cognitivo-conductual y el manejo del estrés. O, si es necesario, usted podría optar por tratar de dejar de fumar, reducir benito presión arterial o controlar mejor christian niveles de azúcar en la carleen. Estas clases de cambios saludables también pueden tener un [place]. Si usted deep que benito tratamiento no está dando resultado, hable con benito médico. Y asegúrese de decirle a benito médico si piensa que pudiera estar deprimido o ansioso. Estos son problemas comunes que también pueden tratarse. La atención de seguimiento es lynda parte clave de benito tratamiento y seguridad.  Asegúrese de hacer y acudir a todas las citas, y llame a benito médico si está teniendo problemas. También es lynda buena idea saber los resultados de los exámenes y mantener lynda lista de los medicamentos que bhavna. Cómo puede cuidarse en el hogar? · Sea jonathan con los medicamentos. Ruby y siga todas las instrucciones de la [place]. ¨ Si el médico le recetó un analgésico (medicamento para el dolor), tómelo según las indicaciones. ¨ Si no está tomando un analgésico recetado, pregúntele al médico si puede josef adrienne de [employer]. · Deje las tareas difíciles para los días en que tenga menos dolor. Alterne las tareas difíciles con tareas fáciles. Y recuerde josef descansos. · Relájese y reduzca el estrés. Karthikeyan vez quiera probar la respiración profunda o la meditación. Estas pueden ayudar. · Manténgase en movimiento. El ejercicio suave a diario puede ayudar a reducir el dolor. Benito médico o fisioterapeuta pueden decirle qué tipo de ejercicio le conviene a usted. [employer] puede incluir caminar, nadar y usar lynda Nader Ric. También puede incluir estiramientos y ejercicios de amplitud de [place]. · Pruebe con calor, compresas frías y masajes. · Duerma lo suficiente. El dolor cece puede hacerle sentir más cansado. Si el dolor le da dificultades para dormir, hable con benito médico. 
· Piense de [place]mil[place]o positiva. Henrietta pensamientos pueden afectar benito dolor. Gris cosas divertidas que lo distraigan del dolor. Cherelle lynda película, ruby un libro, escuche música o pase tiempo con un amigo. · Mantenga un diario de benito dolor. Trate de anotar la intensidad de benito dolor y cómo se siente. También trate de estar atento y anotar de qué manera henrietta estados de ánimo, pensamientos, sueño, actividades y medicamentos afectan el dolor. Estas notas pueden ayudarles a usted y a benito médico a encontrar las mejores maneras de tratar benito dolor. Cómo reducir el estreñimiento causado por Rosalynd Sinner Los analgésicos con frecuencia causan estreñimiento. Para reducir el estreñimiento: · Incluya en benito alimentación frutas, verduras, frijoles (habichuelas) y granos integrales todos los días. Estos alimentos son ricos en fibra. · Carmela abundantes líquidos, los suficientes alvarado para que benito orina sea de color amarillo kelin o transparente alvarado el agua. Si tiene [employer], del corazón o del hígado y tiene que [place]'s líquidos, hable con benito médico antes de aumentar benito consumo. · Gris algo de ejercicio todos los ana. Aumente el tiempo en forma gradual hasta entre 30 y 61 minutos al día, abigail 5 o más días a la semana. · Si es necesario, tome un suplemento de Erica, alvarado [employer] o Metamucil, todos los [address] Ruby y siga todas las indicaciones de la [place]. · Fije un horario todos los días para evacuar el intestino. Lynda rutina diaria puede ayudar. Tómese benito tiempo y no se esfuerce cuando esté evacuando. · Pregúntele a benito médico si debe josef un laxante. El objetivo es tener lynda evacuación del intestino sin problemas cada 1 o 2 días. No espere más de 3 días para tratar el estreñimiento. Cuándo debe pedir ayuda? Llame a benito médico ahora mismo o busque atención médica inmediata si: 
· Se siente clair, ansioso o desesperanzado por más de [address] [facility]. Proberta podría significar que está deprimido. La depresión es común [employer] personas que tienen mucho dolor. Joseluis puede tratarse. · Tiene problemas con la evacuación intestinal, tales alvarado: ¨ No ha tenido evacuaciones intestinales en 3 días. ¨ Hay carleen en la lawanda anal, las heces o el papel higiénico. 
¨ Tiene diarrea por más de 24 horas. Preste especial atención a los cambios en benito alcon y asegúrese de comunicarse con benito médico si: 
· Benito dolor empeora. · No puede dormir debido al dolor. · Se siente muy preocupado o ansioso por benito dolor. · Tiene problemas para josef christian analgésicos. · Tiene inquietudes con respecto al analgésico o a christian efectos secundarios. · Tiene vómito o retortijones por más de 2 horas. Dónde puede encontrar más información en inglés? Ana Lipoma a [email or web address] Paloma Servant D200 en la búsqueda para aprender más acerca de \"Dolor neuropático: Instrucciones de cuidado - [ Neuropathic Pain: Care Instructions ]. \" 
Revisado: 14 octubre, 2016 Versión del contenido: 11.3 © 4148-3552 Healthwise, Incorporated. Las instrucciones de cuidado fueron adaptadas bajo licencia por [employer] (which disclaims liability or warranty for this information). Si usted tiene [place] afección médica o sobre estas instrucciones, siempre pregunte a benito profesional de alcon. Healthwise, Incorporated niega toda garantía o responsabilidad por benito uso de esta información. Introducing [facility] [place] [facility]! [facility] introduces [employer] patient portal. Now you can access parts of your medical record, email your doctor's office, and request medication refills online. 1. In your internet browser, go to [email or web address] 2. Click on the First Time User? Click Here link in the Sign In box. You will see the New Member Sign Up page. 3. Enter your [employer] Access Code exactly as it appears below. You will not need to use this code after youve completed the sign-up process. If you do not sign up before the expiration date, you must request a new code. · [employer] Access Code: [email or web address] Expires: 12/11/2017 12:53 PM 
 
4. Enter the last four digits of your Social Security Number (xxxx) and Date of Birth (mm/dd/yyyy) as indicated and click Submit. You will be taken to the next sign-up page. 5. Create a [employer] ID. This will be your [employer] login ID and cannot be changed, so think of one that is secure and easy to remember. 6. Create a [employer] password. You can change your password at any time. 7. Enter your Password Reset Question and Answer. This can be used at a later time if you forget your password. 8. Enter your e-mail address. You will receive e-mail notification when new information is available in [address] 9. Click Sign Up. You can now view and download portions of your medical record. 10. Click the Download Summary menu link to download a portable copy of your medical information. If you have questions, please visit the Frequently Asked Questions section of the [employer] website. Remember, [employer] is NOT to be used for urgent needs. For medical emergencies, dial 911. Now available from your iPhone and Android! Please provide this summary of care documentation to your next provider. Your primary care clinician is listed as Claudia Valdivia. If you have any questions after today's visit, please call [phone].

## 2018-01-19 ENCOUNTER — OFFICE VISIT (OUTPATIENT)
Dept: ENDOCRINOLOGY | Age: 33
End: 2018-01-19

## 2018-01-19 VITALS
TEMPERATURE: 97.1 F | HEART RATE: 113 BPM | BODY MASS INDEX: 20.67 KG/M2 | WEIGHT: 128.6 LBS | SYSTOLIC BLOOD PRESSURE: 128 MMHG | OXYGEN SATURATION: 100 % | HEIGHT: 66 IN | RESPIRATION RATE: 14 BRPM | DIASTOLIC BLOOD PRESSURE: 88 MMHG

## 2018-01-19 DIAGNOSIS — E11.65 TYPE 2 DIABETES MELLITUS WITH HYPERGLYCEMIA, WITH LONG-TERM CURRENT USE OF INSULIN (HCC): ICD-10-CM

## 2018-01-19 DIAGNOSIS — Z79.4 TYPE 2 DIABETES MELLITUS WITH HYPERGLYCEMIA, WITH LONG-TERM CURRENT USE OF INSULIN (HCC): ICD-10-CM

## 2018-01-19 DIAGNOSIS — Z79.4 TYPE 2 DIABETES MELLITUS WITH HYPERGLYCEMIA, WITH LONG-TERM CURRENT USE OF INSULIN (HCC): Primary | ICD-10-CM

## 2018-01-19 DIAGNOSIS — E78.00 HYPERCHOLESTEREMIA: ICD-10-CM

## 2018-01-19 DIAGNOSIS — E11.65 TYPE 2 DIABETES MELLITUS WITH HYPERGLYCEMIA, WITH LONG-TERM CURRENT USE OF INSULIN (HCC): Primary | ICD-10-CM

## 2018-01-19 DIAGNOSIS — I10 ESSENTIAL HYPERTENSION: ICD-10-CM

## 2018-01-19 LAB — HBA1C MFR BLD HPLC: 10.1 %

## 2018-01-19 NOTE — PROGRESS NOTES
Lluvia Dunn is a 27 y.o. male here for   Chief Complaint   Patient presents with    Diabetes    Cholesterol Problem    Hypertension     Constant bilateral leg pain since July 2017, Gabapentin not helping  Pt has lost 18 lbs since last visit, without trying, and is concerned    Functional glucose monitor and record keeping system? - not checking  Eye exam within last year? - no, need referral    1. Have you been to the ER, urgent care clinic since your last visit? Hospitalized since your last visit? -no    2. Have you seen or consulted any other health care providers outside of the [address] [place] [address] since your last visit?   Include any pap smears or colon screening.-no      Lab Results   Component Value Date/Time    Hemoglobin A1c 12.3 02/25/2015 10:26 AM    Hemoglobin A1c (POC) 9.7 09/28/2017 09:19 AM       Wt Readings from Last 3 Encounters:   09/28/17 146 lb 9.6 oz (66.5 kg)   09/12/17 147 lb 11.3 oz (67 kg)   06/14/17 142 lb 9.6 oz (64.7 kg)     Temp Readings from Last 3 Encounters:   09/28/17 97.4 °F (36.3 °C) (Oral)   09/12/17 98 °F (36.7 °C)   06/14/17 96.7 °F (35.9 °C) (Oral)     BP Readings from Last 3 Encounters:   09/28/17 125/85   09/12/17 (!) 139/97   06/14/17 (!) 120/91     Pulse Readings from Last 3 Encounters:   09/28/17 92   09/12/17 94   06/14/17 89

## 2018-01-19 NOTE — PATIENT INSTRUCTIONS
Check blood sugars before breakfast and dinner,bedtime    If the bedtime sugars are less than 100 ,eat a 15 gm snack. Weight and diet control. Continue Metformin    Novolin N cloudy insulin 30 units in AM , 20 units at  PM    Novolin R clear insulin ( fast acting) 8 units before breakfast and dinner. Novolin R as needed if glucose is    Blood sugar  Breakfast/Lunch/Dinner        130-200  Add  3  Units       201-250  Add  6 Units       251-300  Add  9 Units       301-350  Add 12  Units        351-400  Add 15  Units       Continue metformin       Dolor neuropático: Instrucciones de cuidado - [ Neuropathic Pain: Care Instructions ]  Instrucciones de cuidado  El dolor neuropático es causado por presión o daño en los nervios. A menudo se lo llama simplemente dolor nervioso. Algunas personas sienten michael tipo de dolor todo [place]. Para otras, aparece y desaparece. La diabetes, la culebrilla o lynda lesión pueden causar dolor nervioso. Muchas personas dicen que el dolor que sienten es tiffanie, ardiente o punzante. Joseluis algunas personas lo sienten alvarado un dolor sordo. En algunos casos, puede hacer que benito piel esté muy sensible. De modo que el hecho de que lo toquen, presión y otras sensaciones que antes no le dolían ahora sí le duelen. Es importante que sepa que esta clase de dolor es real y puede afectar benito calidad de elliot. Es [facility] importante que sepa que se puede ayudar con tratamiento. El tratamiento incluye analgésicos (medicamentos para el dolor), ejercicio y fisioterapia. Los medicamentos pueden ayudar a reducir la cantidad de señales de dolor que recorren los nervios. [place] puede hacer que las zonas doloridas caleb menos sensibles. También pueden ayudarle a dormir mejor y a mejorar benito estado de ánimo. Joseluis los medicamentos son solamente lynda parte de un tratamiento exitoso. La mayoría de las personas se benefician con más de lynda clase de [place].  Benito médico podría recomendarle que pruebe la terapia cognitivo-conductual y el manejo del estrés. O, si es necesario, usted podría optar por tratar de dejar de fumar, reducir benito presión arterial o controlar mejor henrietta niveles de azúcar en la carleen. Estas clases de cambios saludables también pueden tener un [place]. Si usted deep que benito tratamiento no está dando resultado, hable con benito médico. Y asegúrese de decirle a benito médico si piensa que pudiera estar deprimido o ansioso. Estos son problemas comunes que también pueden tratarse. La atención de seguimiento es lynda parte clave de benito tratamiento y seguridad. Asegúrese de hacer y acudir a todas las citas, y llame a benito médico si está teniendo problemas. También es lynda buena idea saber los resultados de los exámenes y mantener lynda lista de los medicamentos que bhavna. ¿Cómo puede cuidarse en el hogar? · Sea jonathan con los medicamentos. [place] y siga todas las instrucciones de la [place]. ¨ Si el médico le recetó un analgésico (medicamento para el dolor), tómelo según las indicaciones. ¨ Si no está tomando un analgésico recetado, pregúntele al médico si puede josef adrienne de [employer]. · Deje las tareas difíciles para los días en que tenga menos dolor. Alterne las tareas difíciles con tareas fáciles. Y recuerde josef descansos. · Relájese y reduzca el estrés. Karthikeyan vez quiera probar la respiración profunda o la meditación. Estas pueden ayudar. · Manténgase en movimiento. El ejercicio suave a diario puede ayudar a reducir el dolor. Benito médico o fisioterapeuta pueden decirle qué tipo de ejercicio le conviene a usted. [place] puede incluir caminar, nadar y usar lynda [place]. También puede incluir estiramientos y ejercicios de amplitud de [place]. · Pruebe con calor, compresas frías y masajes. · Duerma lo suficiente. El dolor cece puede hacerle sentir más cansado. Si el dolor le da dificultades para dormir, hable con benito médico.  · Piense de [place] positiva. Henrietta pensamientos pueden afectar benito dolor.  Radha Abdi divertidas que lo distraigan del dolor. Cherelle lynda película, ruby un libro, escuche música o pase tiempo con un amigo. · Mantenga un diario de benito dolor. Trate de anotar la intensidad de benito dolor y cómo se siente. También trate de estar atento y anotar de qué manera christian estados de ánimo, pensamientos, sueño, actividades y medicamentos afectan el dolor. Estas notas pueden ayudarles a usted y a benito médico a encontrar las mejores maneras de tratar benito dolor. Cómo reducir el estreñimiento causado por los analgésicos  Los analgésicos con frecuencia causan estreñimiento. Para reducir el estreñimiento:  · Incluya en benito alimentación frutas, verduras, frijoles (habichuelas) y granos integrales todos los días. Estos alimentos son ricos en fibra. · Carmela abundantes líquidos, los suficientes alvarado para que benito orina sea de color amarillo kelin o transparente alvarado el agua. Si tiene [facility] [employer], del corazón o del hígado y tiene que [place]'s líquidos, hable con benito médico antes de aumentar benito consumo. · Gris algo de ejercicio todos los ana. Aumente el tiempo en forma gradual hasta entre 30 y 61 minutos al día, abigail 5 o más días a la semana. · Si es necesario, tome un suplemento de Erica, alvarado [employer] o Metamucil, todos los [address] [place] y siga todas las indicaciones de la [place]. · Fije un horario todos los días para evacuar el intestino. Lynda rutina diaria puede ayudar. Tómese benito tiempo y no se esfuerce cuando esté evacuando. · Pregúntele a benito médico si debe josef un laxante. El objetivo es tener lynda evacuación del intestino sin problemas cada 1 o 2 días. No espere más de 3 días para tratar el estreñimiento. ¿Cuándo debe pedir ayuda? Llame a benito médico ahora mismo o busque atención médica inmediata si:  · Se siente clair, ansioso o desesperanzado por más de [address] [facility]. [place] podría significar que está deprimido. La depresión es común [employer] personas que tienen mucho dolor. Joseluis puede tratarse.   · Tiene problemas con la evacuación intestinal, tales alvarado:  ¨ No ha tenido evacuaciones intestinales en 3 días. ¨ Hay carleen en la lawanda anal, las heces o el papel higiénico.  ¨ Tiene diarrea por más de 24 horas. Preste especial atención a los cambios en benito alcon y asegúrese de comunicarse con benito médico si:  · Benito dolor empeora. · No puede dormir debido al dolor. · Se siente muy preocupado o ansioso por benito dolor. · Tiene problemas para josef christian analgésicos. · Tiene inquietudes con respecto al analgésico o a christian efectos secundarios. · Tiene vómito o retortijones por más de 2 horas. ¿Dónde puede encontrar más información en inglés? Elissa Rosas a http://tera-barbara.[email or web address] Babs Jaquez [id number] en la búsqueda para aprender más acerca de \"Dolor neuropático: Instrucciones de cuidado - [ Neuropathic Pain: Care Instructions ]. \"  Revisado: 14 octubre, 2016  Versión del contenido: 11.3  © 5546-8410 Healthwise, Incorporated. Las instrucciones de cuidado fueron adaptadas bajo licencia por [employer] (which disclaims liability or warranty for this information). Si usted tiene [place] afección médica o sobre estas instrucciones, siempre pregunte a benito profesional de alcon. Healthwise, Incorporated niega toda garantía o responsabilidad por benito uso de esta información.

## 2018-01-19 NOTE — MR AVS SNAPSHOT
[address] [id number] 
[phone] Patient: Jamila West Townsend MRN: [id number] :[id number] Visit Information Date & Time Provider Department Dept. Phone Encounter #  
 2018  9:30 AM Nilam Rust MD [facility] [phone] [id number] Follow-up Instructions Return in about 3 months (around 2018). Upcoming Health Maintenance Date Due  
 EYE EXAM RETINAL OR DILATED Q1 10/29/1993 Pneumococcal 19-64 Medium Risk (1 of 1 - PPSV23) 10/29/2002 DTaP/Tdap/Td series (1 - Tdap) 10/29/2004 FOOT EXAM Q1 2016 MICROALBUMIN Q1 2016 LIPID PANEL Q1 2016 Influenza Age 5 to Adult 2017 HEMOGLOBIN A1C Q6M 3/28/2018 Allergies as of 2018  Review Complete On: 2018 By: Edda Guerrero LPN No Known Allergies Current Immunizations  Never Reviewed No immunizations on file. Not reviewed this visit You Were Diagnosed With   
  
 Codes Comments Type 2 diabetes mellitus with hyperglycemia, with long-term current use of insulin (HCC)    -  Primary ICD-10-CM: E11.65, Z79.4 ICD-9-CM: 250.00, 790.29, V58.67 Hypercholesteremia     ICD-10-CM: E78.00 ICD-9-CM: 272.0 Essential hypertension     ICD-10-CM: I10 
ICD-9-CM: 401.9 Vitals BP Pulse Temp Resp Height(growth percentile) Weight(growth percentile) 128/88 (BP 1 Location: Right arm, BP Patient Position: Sitting) (!) 113 97.1 °F (36.2 °C) (Oral) 14 5' 6\" (1.676 m) 128 lb 9.6 oz (58.3 kg) SpO2 BMI Smoking Status 100% 20.76 kg/m2 Never Smoker Vitals History BMI and BSA Data Body Mass Index Body Surface Area 20.76 kg/m 2 1.65 m 2 Preferred Pharmacy Pharmacy Name Phone [address] [phone] Your Updated Medication List  
  
   
 This list is accurate as of: 1/19/18 10:14 AM.  Always use your most recent med list.  
  
  
  
  
 cyclobenzaprine 10 mg tablet Commonly known as:  FLEXERIL Take 1 Tab by mouth nightly.  
  
 gabapentin 300 mg capsule Commonly known as:  NEURONTIN Take 1 Cap by mouth nightly. glucose tablet Take 1 Tab by mouth as needed. insulin  unit/mL injection Commonly known as:  NovoLIN N  
INJECT 30 UNITS IN THE MORNING AND 20 UNITS AT 9 PM  
  
 insulin regular 100 unit/mL injection Commonly known as:  NovoLIN R  
INJECT 8 UNITS BEFORE DINNER WITH SSI MAX UNITS DAILY: 53  
  
 insulin syringe-needle U-100 1/2 mL 31 gauge x 15/64\" Syrg Commonly known as:  BD INSULIN SYRINGE ULTRA-FINE  
1 Syringe by Does Not Apply route two (2) times a day. lisinopril 10 mg tablet Commonly known as:  Dellis Rumble Take 1 Tab by mouth daily. metFORMIN 1,000 mg tablet Commonly known as:  GLUCOPHAGE Take 1 Tab by mouth two (2) times daily (with meals). pravastatin 10 mg tablet Commonly known as:  PRAVACHOL Take 1 Tab by mouth nightly. We Performed the Following AMB POC HEMOGLOBIN A1C [09573 CPT(R)] Follow-up Instructions Return in about 3 months (around 4/19/2018). Patient Instructions Check blood sugars before breakfast and dinner,bedtime If the bedtime sugars are less than 100 ,eat a 15 gm snack. Weight and diet control. Continue Metformin Novolin N cloudy insulin 30 units in AM , 20 units at  PM 
 
Novolin R clear insulin ( fast acting) 8 units before breakfast and dinner. Novolin R as needed if glucose is Blood sugar  Breakfast/Lunch/Dinner 130-200  Add  3  Units 201-250  Add  6 Units 251-300  Add  9 Units 301-350  Add 12  Units 351-400  Add 15  Units Continue metformin Dolor neuropático: Instrucciones de cuidado - [ Neuropathic Pain: Care Instructions ] Instrucciones de cuidado El dolor neuropático es causado por presión o daño en los nervios. A menudo se lo llama simplemente dolor nervioso. Algunas personas sienten michael tipo de dolor todo [place]. Para otras, aparece y desaparece. La diabetes, la culebrilla o lynda lesión pueden causar dolor nervioso. Muchas personas dicen que el dolor que sienten es tiffanie, ardiente o punzante. Joseluis algunas personas lo sienten alvarado un dolor sordo. En algunos casos, puede hacer que benito piel esté muy sensible. De modo que el hecho de que lo toquen, presión y otras sensaciones que antes no le dolían ahora sí le duelen. Es importante que sepa que esta clase de dolor es real y puede afectar benito calidad de elliot. Es [facility] importante que sepa que se puede ayudar con tratamiento. El tratamiento incluye analgésicos (medicamentos para el dolor), ejercicio y fisioterapia. Los medicamentos pueden ayudar a reducir la cantidad de señales de dolor que recorren los nervios. [place] puede hacer que las zonas doloridas caleb menos sensibles. También pueden ayudarle a dormir mejor y a mejorar benito estado de ánimo. Joseluis los medicamentos son solamente lynda parte de un tratamiento exitoso. La mayoría de las personas se benefician con más de lynda clase de [place]. Benito médico podría recomendarle que pruebe la terapia cognitivo-conductual y el manejo del estrés. O, si es necesario, usted podría optar por tratar de dejar de fumar, reducir benito presión arterial o controlar mejor christian niveles de azúcar en la carleen. Estas clases de cambios saludables también pueden tener un [place]. Si usted deep que benito tratamiento no está dando resultado, hable con benito médico. Y asegúrese de decirle a benito médico si piensa que pudiera estar deprimido o ansioso. Estos son problemas comunes que también pueden tratarse. La atención de seguimiento es lynda parte clave de benito tratamiento y seguridad.  Asegúrese de hacer y acudir a todas las citas, y llame a benito médico si está teniendo problemas. También es lynda buena idea saber los resultados de los exámenes y mantener lynda lista de los medicamentos que bhavna. Cómo puede cuidarse en el hogar? · Sea jonathan con los medicamentos. [place] y siga todas las instrucciones de la [place]. ¨ Si el médico le recetó un analgésico (medicamento para el dolor), tómelo según las indicaciones. ¨ Si no está tomando un analgésico recetado, pregúntele al médico si puede josef adrienne de [employer]. · Deje las tareas difíciles para los días en que tenga menos dolor. Alterne las tareas difíciles con tareas fáciles. Y recuerde josef descansos. · Relájese y reduzca el estrés. Karthikeyan vez quiera probar la respiración profunda o la meditación. Estas pueden ayudar. · Manténgase en movimiento. El ejercicio suave a diario puede ayudar a reducir el dolor. Benito médico o fisioterapeuta pueden decirle qué tipo de ejercicio le conviene a usted. [place] puede incluir caminar, nadar y usar lynda [place] [employer]. También puede incluir estiramientos y ejercicios de amplitud de [place]. · Pruebe con calor, compresas frías y masajes. · Duerma lo suficiente. El dolor cece puede hacerle sentir más cansado. Si el dolor le da dificultades para dormir, hable con benito médico. 
· Piense de [place] positiva. [place] pensamientos pueden afectar bneito dolor. Gris cosas divertidas que lo distraigan del dolor. Cherelle lynda película, ruby un libro, escuche música o pase tiempo con un amigo. · Mantenga un diario de benito dolor. Trate de anotar la intensidad de benito dolor y cómo se siente. También trate de estar atento y anotar de qué manera henrietta estados de ánimo, pensamientos, sueño, actividades y medicamentos afectan el dolor. Estas notas pueden ayudarles a usted y a benito médico a encontrar las mejores maneras de tratar benito dolor. Cómo reducir el estreñimiento causado por Kavita Perone Los analgésicos con frecuencia causan estreñimiento. Para reducir el estreñimiento: · Incluya en benito alimentación frutas, verduras, frijoles (habichuelas) y granos integrales todos los días. Estos alimentos son ricos en fibra. · Carmela abundantes líquidos, los suficientes alvarado para que benito orina sea de color amarillo kelin o transparente alvarado el agua. Si tiene [employer], del corazón o del hígado y tiene que [place]'s líquidos, hable con benito médico antes de aumentar benito consumo. · Gris algo de ejercicio todos los ana. Aumente el tiempo en forma gradual hasta entre 30 y 61 minutos al día, abigail 5 o más días a la semana. · Si es necesario, tome un suplemento de Erica, alvarado [employer] o Metamucil, todos los [address] [place] y siga todas las indicaciones de la [place]. · Fije un horario todos los días para evacuar el intestino. Lynda rutina diaria puede ayudar. Tómese benito tiempo y no se esfuerce cuando esté evacuando. · Pregúntele a benito médico si debe josef un laxante. El objetivo es tener lynda evacuación del intestino sin problemas cada 1 o 2 días. No espere más de 3 días para tratar el estreñimiento. Cuándo debe pedir ayuda? Llame a benito médico ahora mismo o busque atención médica inmediata si: 
· Se siente clair, ansioso o desesperanzado por más de [id number] [facility]. [place] podría significar que está deprimido. La depresión es común [employer] personas que tienen mucho dolor. Joseluis puede tratarse. · Tiene problemas con la evacuación intestinal, tales alvarado: ¨ No ha tenido evacuaciones intestinales en 3 días. ¨ Hay carleen en la lawanda anal, las heces o el papel higiénico. 
¨ Tiene diarrea por más de 24 horas. Preste especial atención a los cambios en benito alcon y asegúrese de comunicarse con benito médico si: 
· Benito dolor empeora. · No puede dormir debido al dolor. · Se siente muy preocupado o ansioso por benito dolor. · Tiene problemas para josef christian analgésicos. · Tiene inquietudes con respecto al analgésico o a christian efectos secundarios. · Tiene vómito o retortijones por más de 2 horas. Dónde puede encontrar más información en inglés? Marcia Deshpande a [email or web address]-barbara.info/. Tiara Shoemaker [id number] en la búsqueda para aprender más acerca de \"Dolor neuropático: Instrucciones de cuidado - [ Neuropathic Pain: Care Instructions ]. \" 
Revisado: 14 octubre, 2016 Versión del contenido: 11.3 © 1084-8196 Healthwise, Incorporated. Las instrucciones de cuidado fueron adaptadas bajo licencia por [employer] (which disclaims liability or warranty for this information). Si usted tiene [place] afección médica o sobre estas instrucciones, siempre pregunte a benito profesional de alcon. Healthwise, Incorporated niega toda garantía o responsabilidad por benito uso de esta información. Introducing [facility]! Judah Zaldivar introduces [employer] patient portal. Now you can access parts of your medical record, email your doctor's office, and request medication refills online. 1. In your internet browser, go to [email or web address] 2. Click on the First Time User? Click Here link in the Sign In box. You will see the New Member Sign Up page. 3. Enter your Mixx Access Code exactly as it appears below. You will not need to use this code after youve completed the sign-up process. If you do not sign up before the expiration date, you must request a new code. · Mix[employer] Access Code: [email or web address] Expires: 4/19/2018 10:14 AM 
 
4. Enter the last four digits of your Social Security Number (xxxx) and Date of Birth (mm/dd/yyyy) as indicated and click Submit. You will be taken to the next sign-up page. 5. Create a [employer] ID. This will be your Mixx login ID and cannot be changed, so think of one that is secure and easy to remember. 6. Create a Mixx password. You can change your password at any time. 7. Enter your Password Reset Question and Answer. This can be used at a later time if you forget your password. 8. Enter your e-mail address. You will receive e-mail notification when new information is available in [address] 9. Click Sign Up. You can now view and download portions of your medical record. 10. Click the Download Summary menu link to download a portable copy of your medical information. If you have questions, please visit the Frequently Asked Questions section of the [employer] website. Remember, [employer] is NOT to be used for urgent needs. For medical emergencies, dial 911. Now available from your iPhone and Android! Please provide this summary of care documentation to your next provider. Your primary care clinician is listed as Shaun Duffy. If you have any questions after today's visit, please call [phone].

## 2018-01-19 NOTE — PROGRESS NOTES
Guerita García MD          Patient Information  Date:1/19/2018  Name : Kian Barragan 27 y.o.     YOB: 1985         Referred by: Omi Srivastava NP         Chief Complaint   Patient presents with    Diabetes    Cholesterol Problem    Hypertension       History of Present Illness: Kian Barragan is a 27 y.o. male here for follow up of  Type 2 Diabetes Mellitus. He is on basal bolus insulin, no insurance. He has not been taking the evening dose of NPH. Diet has been unhealthy, drinks 12 pack of beer during the weekend. Not checking the blood glucose  Complains of lower extremity pain     He has no blurring of his vision. Has a strong family history of type 2 diabetes. Wt Readings from Last 3 Encounters:   01/19/18 128 lb 9.6 oz (58.3 kg)   09/28/17 146 lb 9.6 oz (66.5 kg)   09/12/17 147 lb 11.3 oz (67 kg)       BP Readings from Last 3 Encounters:   01/19/18 128/88   09/28/17 125/85   09/12/17 (!) 139/97           Past Medical History:   Diagnosis Date    Diabetes ([address]     Type 2 diabetes mellitus (HCC)      Current Outpatient Prescriptions   Medication Sig    pravastatin (PRAVACHOL) 10 mg tablet Take 1 Tab by mouth nightly.  lisinopril (PRINIVIL, ZESTRIL) 10 mg tablet Take 1 Tab by mouth daily.  gabapentin (NEURONTIN) 300 mg capsule Take 1 Cap by mouth nightly.  metFORMIN (GLUCOPHAGE) 1,000 mg tablet Take 1 Tab by mouth two (2) times daily (with meals).  cyclobenzaprine (FLEXERIL) 10 mg tablet Take 1 Tab by mouth nightly.  insulin regular (NOVOLIN R) 100 unit/mL injection INJECT 8 UNITS BEFORE DINNER WITH SSI MAX UNITS DAILY: 53    insulin NPH (NOVOLIN N) 100 unit/mL injection INJECT 30 UNITS IN THE MORNING AND 20 UNITS AT 9 PM    insulin syringe-needle U-100 (BD INSULIN SYRINGE ULTRA-FINE) 1/2 mL 31 x 15/64\" syrg 1 Syringe by Does Not Apply route two (2) times a day.     glucose tablet Take 1 Tab by mouth as needed. No current facility-administered medications for this visit. No Known Allergies      Review of Systems:  - Constitutional Symptoms: no fevers, no chills  - Eyes: no blurry vision no double vision  - Cardiovascular: no chest pain ,no palpitations  - Respiratory: no cough no shortness of breath  - Integumentary: no rashes  - Neurological: no numbness, tingling, no  headaches  -     Physical Examination:   Blood pressure 128/88, pulse (!) 113, temperature 97.1 °F (36.2 °C), temperature source Oral, resp. rate 14, height 5' 6\" (1.676 m), weight 128 lb 9.6 oz (58.3 kg), SpO2 100 %. Estimated body mass index is 20.76 kg/(m^2) as calculated from the following:    Height as of this encounter: 5' 6\" (1.676 m). -   Weight as of this encounter: 128 lb 9.6 oz (58.3 kg). - General: pleasant, no distress, good eye contact  - HEENT: no pallor, no periorbital edema, EOMI  - Neck: supple, no thyromegaly  - Cardiovascular: regular, normal rate, normal S1 and S2  - Respiratory: clear to auscultation bilaterally  - Gastrointestinal: soft, nontender, nondistended,  BS +  - Musculoskeletal: :no edema,   - Neurological:alert and oriented  - Psychiatric: normal mood and affect  - Skin: color, texture, turgor normal.     Diabetic foot exam:     Left:     Vibratory sensation normal    Filament test normal sensation with micro filament   Pulse DP: 1+    Deformities: None  Right:    Vibratory sensation normal   Filament test normal sensation with micro filament   Pulse DP: 1+   Deformities: None      Data Reviewed:     [] Glucose records reviewed. [] See glucose records for details (to be scanned).   [] A1C  [x] Reviewed labs    Lab Results   Component Value Date/Time    Hemoglobin A1c 12.3 02/25/2015 10:26 AM    Hemoglobin A1c 12.8 05/06/2014 01:50 PM    Glucose 256 08/23/2016 10:12 AM    Glucose (POC) 184 08/23/2016 11:45 AM    Glucose  10/17/2016 08:46 AM    Microalbumin/Creat ratio (mg/g creat) 45 05/06/2014 09:30 PM    Microalbumin,urine random 2.22 05/06/2014 09:30 PM    LDL, calculated 144 02/25/2015 10:26 AM    Creatinine 0.53 08/23/2016 10:12 AM      Lab Results   Component Value Date/Time    ALT (SGPT) 23 02/25/2015 10:26 AM    AST (SGOT) 13 02/25/2015 10:26 AM    Alk. phosphatase 76 02/25/2015 10:26 AM    Bilirubin, total 0.7 02/25/2015 10:26 AM     Lab Results   Component Value Date/Time    GFR est AA >60 08/23/2016 10:12 AM    GFR est non-AA >60 08/23/2016 10:12 AM    Creatinine 0.53 08/23/2016 10:12 AM    BUN 13 08/23/2016 10:12 AM    Sodium 138 08/23/2016 10:12 AM    Potassium 3.8 08/23/2016 10:12 AM    Chloride 100 08/23/2016 10:12 AM    CO2 24 08/23/2016 10:12 AM           Assessment/Plan:     1. Type 2 Diabetes Mellitus ,uncontrolled  Lab Results   Component Value Date/Time    Hemoglobin A1c 12.3 02/25/2015 10:26 AM    Hemoglobin A1c (POC) 10.1 01/19/2018 09:44 AM   Uncontrolled diabetes  Missing evening insulin    NPH 30 units in the morning and 20 units at bedtime  Novolin R 8 units before Breakfast and dinner,cost is playing a role  Continue metformin  Advised to check glucose 2  times daily  Annual eye exam,microalbumin    2. HTN : Continue current therapy     3. Hyperlipidemia - statin    4 Neuropathy - due to ETOH + DM   Need labs and since he has no insurance he is hesitating to get labs   MVI ,banana, yogurt , gabapentin       Long-term risks explained    Thank you for allowing me to participate in the care of this patient.     Eli Joyner MD

## 2018-02-23 ENCOUNTER — TELEPHONE (OUTPATIENT)
Dept: ENDOCRINOLOGY | Age: 33
End: 2018-02-23

## 2018-02-23 LAB
BASOPHILS # BLD AUTO: 0.1 X10E3/UL (ref 0–0.2)
BASOPHILS NFR BLD AUTO: 1 %
BUN SERPL-MCNC: 11 MG/DL (ref 6–20)
BUN/CREAT SERPL: 16 (ref 9–20)
CALCIUM SERPL-MCNC: 10.1 MG/DL (ref 8.7–10.2)
CHLORIDE SERPL-SCNC: 99 MMOL/L (ref 96–106)
CO2 SERPL-SCNC: 25 MMOL/L (ref 18–29)
CREAT SERPL-MCNC: 0.67 MG/DL (ref 0.76–1.27)
EOSINOPHIL # BLD AUTO: 0.1 X10E3/UL (ref 0–0.4)
EOSINOPHIL NFR BLD AUTO: 1 %
ERYTHROCYTE [DISTWIDTH] IN BLOOD BY AUTOMATED COUNT: 14.2 % (ref 12.3–15.4)
GFR SERPLBLD CREATININE-BSD FMLA CKD-EPI: 125 ML/MIN/1.73
GFR SERPLBLD CREATININE-BSD FMLA CKD-EPI: 145 ML/MIN/1.73
GLUCOSE SERPL-MCNC: 35 MG/DL (ref 65–99)
HCT VFR BLD AUTO: 44.9 % (ref 37.5–51)
HGB BLD-MCNC: 15.7 G/DL (ref 13–17.7)
IMM GRANULOCYTES # BLD: 0 X10E3/UL (ref 0–0.1)
IMM GRANULOCYTES NFR BLD: 0 %
LYMPHOCYTES # BLD AUTO: 2.2 X10E3/UL (ref 0.7–3.1)
LYMPHOCYTES NFR BLD AUTO: 28 %
MCH RBC QN AUTO: 33.8 PG (ref 26.6–33)
MCHC RBC AUTO-ENTMCNC: 35 G/DL (ref 31.5–35.7)
MCV RBC AUTO: 97 FL (ref 79–97)
MONOCYTES # BLD AUTO: 0.7 X10E3/UL (ref 0.1–0.9)
MONOCYTES NFR BLD AUTO: 8 %
NEUTROPHILS # BLD AUTO: 5 X10E3/UL (ref 1.4–7)
NEUTROPHILS NFR BLD AUTO: 62 %
PLATELET # BLD AUTO: 280 X10E3/UL (ref 150–379)
POTASSIUM SERPL-SCNC: 3.9 MMOL/L (ref 3.5–5.2)
RBC # BLD AUTO: 4.64 X10E6/UL (ref 4.14–5.8)
SODIUM SERPL-SCNC: 142 MMOL/L (ref 134–144)
WBC # BLD AUTO: 8.1 X10E3/UL (ref 3.4–10.8)

## 2018-02-23 NOTE — TELEPHONE ENCOUNTER
Yang Gerber from [facility] called with critical lab on pt. States BG during draw was 35. The specimen was received in contact with cells and was not hemolyzed. Informed rep that physician will be notified.

## 2018-02-26 NOTE — TELEPHONE ENCOUNTER
Pt states he is fine and he is not having low BG. Asked him not to skip meals. Pt verbalized understanding.

## 2018-04-20 ENCOUNTER — OFFICE VISIT (OUTPATIENT)
Dept: ENDOCRINOLOGY | Age: 33
End: 2018-04-20

## 2018-04-20 VITALS
SYSTOLIC BLOOD PRESSURE: 142 MMHG | BODY MASS INDEX: 22.87 KG/M2 | RESPIRATION RATE: 14 BRPM | WEIGHT: 142.3 LBS | DIASTOLIC BLOOD PRESSURE: 88 MMHG | TEMPERATURE: 97.2 F | HEART RATE: 111 BPM | OXYGEN SATURATION: 99 % | HEIGHT: 66 IN

## 2018-04-20 DIAGNOSIS — E78.00 HYPERCHOLESTEREMIA: ICD-10-CM

## 2018-04-20 DIAGNOSIS — Z79.4 TYPE 2 DIABETES MELLITUS WITH HYPERGLYCEMIA, WITH LONG-TERM CURRENT USE OF INSULIN (HCC): Primary | ICD-10-CM

## 2018-04-20 DIAGNOSIS — E11.65 TYPE 2 DIABETES MELLITUS WITH HYPERGLYCEMIA, WITH LONG-TERM CURRENT USE OF INSULIN (HCC): Primary | ICD-10-CM

## 2018-04-20 DIAGNOSIS — I10 ESSENTIAL HYPERTENSION: ICD-10-CM

## 2018-04-20 LAB — HBA1C MFR BLD HPLC: 8.6 %

## 2018-04-20 NOTE — MR AVS SNAPSHOT
[address] [address] [address] [facility] [address] 
[phone] Patient: Earnest Cummings MRN: [id number] :1985 Visit Information Date & Time Provider Department Dept. Phone Encounter #  
 2018  9:30 AM Zafar Vallejo MD [facility] [facility] [phone] [id number] Follow-up Instructions Return in about 5 months (around 2018). Upcoming Health Maintenance Date Due  
 EYE EXAM RETINAL OR DILATED Q1 10/29/1993 Pneumococcal 19-64 Medium Risk (1 of 1 - PPSV23) 10/29/2002 DTaP/Tdap/Td series (1 - Tdap) 10/29/2004 MICROALBUMIN Q1 2016 LIPID PANEL Q1 2016 Influenza Age 5 to Adult 2017 HEMOGLOBIN A1C Q6M 2018 FOOT EXAM Q1 2019 Allergies as of 2018  Review Complete On: 2018 By: Zafar Vallejo MD  
 No Known Allergies Current Immunizations  Never Reviewed No immunizations on file. Not reviewed this visit You Were Diagnosed With   
  
 Codes Comments Type 2 diabetes mellitus with hyperglycemia, with long-term current use of insulin (HCC)    -  Primary ICD-10-CM: E11.65, Z79.4 ICD-9-CM: 250.00, 790.29, V58.67 Hypercholesteremia     ICD-10-CM: E78.00 ICD-9-CM: 272.0 Essential hypertension     ICD-10-CM: I10 
ICD-9-CM: 401.9 Vitals BP Pulse Temp Resp Height(growth percentile) Weight(growth percentile) 142/88 (BP 1 Location: Left arm, BP Patient Position: Sitting) (!) 111 97.2 °F (36.2 °C) (Oral) 14 5' 6\" (1.676 m) 142 lb 4.8 oz (64.5 kg) SpO2 BMI Smoking Status 99% 22.97 kg/m2 Never Smoker Vitals History BMI and BSA Data Body Mass Index Body Surface Area  
 22.97 kg/m 2 1.73 m 2 Preferred Pharmacy Pharmacy Name Phone [address] [phone] Your Updated Medication List  
  
   
 This list is accurate as of 4/20/18 10:09 AM.  Always use your most recent med list.  
  
  
  
  
 cyclobenzaprine 10 mg tablet Commonly known as:  FLEXERIL Take 1 Tab by mouth nightly.  
  
 gabapentin 300 mg capsule Commonly known as:  NEURONTIN Take 1 Cap by mouth nightly. glucose tablet Take 1 Tab by mouth as needed. insulin  unit/mL injection Commonly known as:  NovoLIN N NPH U-100 Insulin INJECT 30 units in AM , 20 units at  PM  
  
 insulin regular 100 unit/mL injection Commonly known as:  NovoLIN R Regular U-100 Insuln INJECT 8 units before breakfast and dinner WITH SSI MAX UNITS DAILY: 53  
  
 insulin syringe-needle U-100 1/2 mL 31 gauge x 15/64\" Syrg Commonly known as:  BD INSULIN SYRINGE ULTRA-FINE  
1 Syringe by Does Not Apply route two (2) times a day. lisinopril 10 mg tablet Commonly known as:  Gely Drought Take 1 Tab by mouth daily. metFORMIN 1,000 mg tablet Commonly known as:  GLUCOPHAGE Take 1 Tab by mouth two (2) times daily (with meals). pravastatin 10 mg tablet Commonly known as:  PRAVACHOL Take 1 Tab by mouth nightly. We Performed the Following AMB POC HEMOGLOBIN A1C [73659 CPT(R)] Follow-up Instructions Return in about 5 months (around 9/20/2018). Introducing \[facility] [place] [facility]\"" & HEALTH SERVICES! [facility] introduces Pro V&V patient portal. Now you can access parts of your medical record, email your doctor's office, and request medication refills online. 1. In your internet browser, go to [email or web address]. [email or web address] 2. Click on the First Time User? Click Here link in the Sign In box. You will see the New Member Sign Up page. 3. Enter your Pro V&V Access Code exactly as it appears below. You will not need to use this code after youve completed the sign-up process. If you do not sign up before the expiration date, you must request a new code.  
 
· Pro V&V Access Code: [email or web address] 
 Expires: 7/19/2018 10:09 AM 
 
4. Enter the last four digits of your Social Security Number (xxxx) and Date of Birth (mm/dd/yyyy) as indicated and click Submit. You will be taken to the next sign-up page. 5. Create a [employer] ID. This will be your [employer] login ID and cannot be changed, so think of one that is secure and easy to remember. 6. Create a [employer] password. You can change your password at any time. 7. Enter your Password Reset Question and Answer. This can be used at a later time if you forget your password. 8. Enter your e-mail address. You will receive e-mail notification when new information is available in [address] 9. Click Sign Up. You can now view and download portions of your medical record. 10. Click the Download Summary menu link to download a portable copy of your medical information. If you have questions, please visit the Frequently Asked Questions section of the [employer] website. Remember, [employer] is NOT to be used for urgent needs. For medical emergencies, dial 911. Now available from your iPhone and Android! Please provide this summary of care documentation to your next provider. Your primary care clinician is listed as Dave Griffin. If you have any questions after today's visit, please call [phone].

## 2018-04-20 NOTE — PROGRESS NOTES
Donna Barrios MD          Patient Information  Date:4/20/2018  Name : Bakari May 27 y.o.     YOB: 1985         Referred by: Hardy De La Rosa NP         Chief Complaint   Patient presents with    Diabetes       History of Present Illness: Bakari May is a 27 y.o. male here for follow up of  Type 2 Diabetes Mellitus. He is on basal bolus insulin, no insurance. Has applied for [employer] card  He is taking insulin consistently  Did not bring the meter  Reports improvement in the blood glucose as well as polyuria   Pain in the lower extremity better       He has no blurring of his vision. Has a strong family history of type 2 diabetes. Wt Readings from Last 3 Encounters:   04/20/18 142 lb 4.8 oz (64.5 kg)   01/19/18 128 lb 9.6 oz (58.3 kg)   09/28/17 146 lb 9.6 oz (66.5 kg)       BP Readings from Last 3 Encounters:   04/20/18 142/88   01/19/18 128/88   09/28/17 125/85           Past Medical History:   Diagnosis Date    Diabetes ([facility] [address]     Type 2 diabetes mellitus (HCC)      Current Outpatient Prescriptions   Medication Sig    insulin regular (NOVOLIN R) 100 unit/mL injection INJECT 8 units before breakfast and dinner WITH SSI MAX UNITS DAILY: 53    insulin NPH (NOVOLIN N) 100 unit/mL injection INJECT 30 units in AM , 20 units at  PM    pravastatin (PRAVACHOL) 10 mg tablet Take 1 Tab by mouth nightly.  lisinopril (PRINIVIL, ZESTRIL) 10 mg tablet Take 1 Tab by mouth daily.  gabapentin (NEURONTIN) 300 mg capsule Take 1 Cap by mouth nightly.  metFORMIN (GLUCOPHAGE) 1,000 mg tablet Take 1 Tab by mouth two (2) times daily (with meals).  cyclobenzaprine (FLEXERIL) 10 mg tablet Take 1 Tab by mouth nightly.  insulin syringe-needle U-100 (BD INSULIN SYRINGE ULTRA-FINE) 1/2 mL 31 x 15/64\" syrg 1 Syringe by Does Not Apply route two (2) times a day.  glucose tablet Take 1 Tab by mouth as needed. No current facility-administered medications for this visit. No Known Allergies      Review of Systems:  - Constitutional Symptoms: no fevers, no chills  - Eyes: no blurry vision no double vision  - Cardiovascular: no chest pain ,no palpitations  - Respiratory: no cough no shortness of breath  - Integumentary: no rashes  - Neurological: no numbness, tingling, no  headaches  -     Physical Examination:   Blood pressure 142/88, pulse (!) 111, temperature 97.2 °F (36.2 °C), temperature source Oral, resp. rate 14, height 5' 6\" (1.676 m), weight 142 lb 4.8 oz (64.5 kg), SpO2 99 %. Estimated body mass index is 22.97 kg/(m^2) as calculated from the following:    Height as of this encounter: 5' 6\" (1.676 m). -   Weight as of this encounter: 142 lb 4.8 oz (64.5 kg). - General: pleasant, no distress, good eye contact  - HEENT: no pallor, no periorbital edema, EOMI  - Neck: supple, no thyromegaly  - Cardiovascular: regular, normal rate, normal S1 and S2  - Respiratory: clear to auscultation bilaterally  - Gastrointestinal: soft, nontender, nondistended,  BS +  - Musculoskeletal: :no edema,   - Neurological:alert and oriented  - Psychiatric: normal mood and affect  - Skin: color, texture, turgor normal.     Diabetic foot exam: January 2018    Left:     Vibratory sensation normal    Filament test normal sensation with micro filament   Pulse DP: 1+    Deformities: None  Right:    Vibratory sensation normal   Filament test normal sensation with micro filament   Pulse DP: 1+   Deformities: None      Data Reviewed:     [] Glucose records reviewed. [] See glucose records for details (to be scanned).   [] A1C  [x] Reviewed labs    Lab Results   Component Value Date/Time    Hemoglobin A1c 12.3 (H) 02/25/2015 10:26 AM    Hemoglobin A1c 12.8 (H) 05/06/2014 01:50 PM    Glucose 35 (<) 02/22/2018 09:29 AM    Glucose (POC) 184 (H) 08/23/2016 11:45 AM    Glucose  10/17/2016 08:46 AM    Microalbumin/Creat ratio (mg/g creat) 45 (H) 05/06/2014 09:30 PM    Microalbumin,urine random 2.22 05/06/2014 09:30 PM    LDL, calculated 144 (H) 02/25/2015 10:26 AM    Creatinine 0.67 (L) 02/22/2018 09:29 AM      Lab Results   Component Value Date/Time    ALT (SGPT) 23 02/25/2015 10:26 AM    AST (SGOT) 13 02/25/2015 10:26 AM    Alk. phosphatase 76 02/25/2015 10:26 AM    Bilirubin, total 0.7 02/25/2015 10:26 AM     Lab Results   Component Value Date/Time    GFR est  02/22/2018 09:29 AM    GFR est non- 02/22/2018 09:29 AM    Creatinine 0.67 (L) 02/22/2018 09:29 AM    BUN 11 02/22/2018 09:29 AM    Sodium 142 02/22/2018 09:29 AM    Potassium 3.9 02/22/2018 09:29 AM    Chloride 99 02/22/2018 09:29 AM    CO2 25 02/22/2018 09:29 AM           Assessment/Plan:     1. Type 2 Diabetes Mellitus ,uncontrolled  Lab Results   Component Value Date/Time    Hemoglobin A1c 12.3 (H) 02/25/2015 10:26 AM    Hemoglobin A1c (POC) 8.6 04/20/2018 09:40 AM   Improvement in A1c    NPH 30 units in the morning and 20 units at bedtime  Novolin R 8 units before Breakfast and dinner,cost is playing a role  Continue metformin  Advised to check glucose 2  times daily  Annual eye exam,microalbumin    2. HTN : Continue current therapy     3. 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 Nu Michelle MD      Patient verbalized understanding    Voice-recognition software was used to generate this report, which may result in some phonetic-based errors in the grammar and contents. Even though attempts were made to correct all the mistakes, some may have been missed and remained in the body of the report.

## 2018-04-20 NOTE — PROGRESS NOTES
Cindy Client is a 27 y.o. male here for   Chief Complaint   Patient presents with    Diabetes       Functional glucose monitor and record keeping system? - not checking  Eye exam within last year? - no,, need referral  Foot exam within last year? - on file    1. Have you been to the ER, urgent care clinic since your last visit? Hospitalized since your last visit? -no    2. Have you seen or consulted any other health care providers outside of the [address] [place] [address] since your last visit?   Include any pap smears or colon screening.-no      Lab Results   Component Value Date/Time    Hemoglobin A1c 12.3 (H) 02/25/2015 10:26 AM    Hemoglobin A1c (POC) 10.1 01/19/2018 09:44 AM       Wt Readings from Last 3 Encounters:   01/19/18 128 lb 9.6 oz (58.3 kg)   09/28/17 146 lb 9.6 oz (66.5 kg)   09/12/17 147 lb 11.3 oz (67 kg)     Temp Readings from Last 3 Encounters:   01/19/18 97.1 °F (36.2 °C) (Oral)   09/28/17 97.4 °F (36.3 °C) (Oral)   09/12/17 98 °F (36.7 °C)     BP Readings from Last 3 Encounters:   01/19/18 128/88   09/28/17 125/85   09/12/17 (!) 139/97     Pulse Readings from Last 3 Encounters:   01/19/18 (!) 113   09/28/17 92   09/12/17 94

## 2018-05-03 ENCOUNTER — TELEPHONE (OUTPATIENT)
Dept: ENDOCRINOLOGY | Age: 33
End: 2018-05-03

## 2018-05-03 DIAGNOSIS — E11.65 TYPE 2 DIABETES MELLITUS WITH HYPERGLYCEMIA, UNSPECIFIED WHETHER LONG TERM INSULIN USE (HCC): Primary | ICD-10-CM

## 2018-05-04 LAB
ALBUMIN SERPL-MCNC: 5 G/DL (ref 3.5–5.5)
ALBUMIN/CREAT UR: 29.4 MG/G CREAT (ref 0–30)
ALBUMIN/GLOB SERPL: 1.7 {RATIO} (ref 1.2–2.2)
ALP SERPL-CCNC: 74 IU/L (ref 39–117)
ALT SERPL-CCNC: 30 IU/L (ref 0–44)
AST SERPL-CCNC: 17 IU/L (ref 0–40)
BILIRUB SERPL-MCNC: 0.9 MG/DL (ref 0–1.2)
BUN SERPL-MCNC: 11 MG/DL (ref 6–20)
BUN/CREAT SERPL: 18 (ref 9–20)
CALCIUM SERPL-MCNC: 9.9 MG/DL (ref 8.7–10.2)
CHLORIDE SERPL-SCNC: 93 MMOL/L (ref 96–106)
CHOLEST SERPL-MCNC: 207 MG/DL (ref 100–199)
CO2 SERPL-SCNC: 23 MMOL/L (ref 18–29)
CREAT SERPL-MCNC: 0.6 MG/DL (ref 0.76–1.27)
CREAT UR-MCNC: 79.3 MG/DL
EST. AVERAGE GLUCOSE BLD GHB EST-MCNC: 226 MG/DL
GFR SERPLBLD CREATININE-BSD FMLA CKD-EPI: 131 ML/MIN/1.73
GFR SERPLBLD CREATININE-BSD FMLA CKD-EPI: 152 ML/MIN/1.73
GLOBULIN SER CALC-MCNC: 3 G/DL (ref 1.5–4.5)
GLUCOSE SERPL-MCNC: 364 MG/DL (ref 65–99)
HBA1C MFR BLD: 9.5 % (ref 4.8–5.6)
HDLC SERPL-MCNC: 59 MG/DL
INTERPRETATION, 910389: NORMAL
LDLC SERPL CALC-MCNC: 109 MG/DL (ref 0–99)
Lab: NORMAL
MICROALBUMIN UR-MCNC: 23.3 UG/ML
POTASSIUM SERPL-SCNC: 4.2 MMOL/L (ref 3.5–5.2)
PROT SERPL-MCNC: 8 G/DL (ref 6–8.5)
SODIUM SERPL-SCNC: 135 MMOL/L (ref 134–144)
TRIGL SERPL-MCNC: 194 MG/DL (ref 0–149)
VLDLC SERPL CALC-MCNC: 39 MG/DL (ref 5–40)

## 2018-08-28 ENCOUNTER — HOSPITAL ENCOUNTER (EMERGENCY)
Age: 33
Discharge: HOME | End: 2018-08-28
Attending: EMERGENCY MEDICINE
Payer: SUBSIDIZED

## 2018-08-28 ENCOUNTER — APPOINTMENT (OUTPATIENT)
Dept: GENERAL RADIOLOGY | Age: 33
End: 2018-08-28
Attending: NURSE PRACTITIONER
Payer: SUBSIDIZED

## 2018-08-28 VITALS
HEART RATE: 83 BPM | OXYGEN SATURATION: 100 % | DIASTOLIC BLOOD PRESSURE: 88 MMHG | RESPIRATION RATE: 14 BRPM | SYSTOLIC BLOOD PRESSURE: 124 MMHG | TEMPERATURE: 97.9 F | WEIGHT: 140 LBS | HEIGHT: 65 IN | BODY MASS INDEX: 23.32 KG/M2

## 2018-08-28 DIAGNOSIS — M25.522 LEFT ELBOW PAIN: Primary | ICD-10-CM

## 2018-08-28 PROCEDURE — 73080 X-RAY EXAM OF ELBOW: CPT

## 2018-08-28 PROCEDURE — 99283 EMERGENCY DEPT VISIT LOW MDM: CPT

## 2018-08-28 RX ORDER — NAPROXEN 500 MG/1
500 TABLET ORAL 2 TIMES DAILY WITH MEALS
Qty: 20 TAB | Refills: 0 | Status: SHIPPED | OUTPATIENT
Start: 2018-08-28 | End: 2018-09-07
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
## 2018-08-28 NOTE — ED NOTES
Bedside and Verbal shift change report given to [facility] (oncoming nurse) by Kacie Bruno (offgoing nurse). Report included the following information SBAR, ED Summary and Recent Results.

## 2019-04-06 DIAGNOSIS — Z79.4 TYPE 2 DIABETES MELLITUS WITH HYPERGLYCEMIA, WITH LONG-TERM CURRENT USE OF INSULIN (HCC): ICD-10-CM

## 2019-04-06 DIAGNOSIS — E11.65 TYPE 2 DIABETES MELLITUS WITH HYPERGLYCEMIA, WITH LONG-TERM CURRENT USE OF INSULIN (HCC): ICD-10-CM

## 2019-09-30 NOTE — PROGRESS NOTES
Radha Lainez is a 26 y.o. male here for   Chief Complaint   Patient presents with    Diabetes     LV 4/2018    Diabetic Foot Exam       Functional glucose monitor and record keeping system? - not checking  Eye exam within last year? - due  Foot exam within last year? - due    1. Have you been to the ER, urgent care clinic since your last visit? Hospitalized since your last visit? -no    2. Have you seen or consulted any other health care providers outside of the [address] [place] [address] since your last visit?   Include any pap smears or colon screening.-no

## 2019-10-01 ENCOUNTER — OFFICE VISIT (OUTPATIENT)
Dept: ENDOCRINOLOGY | Age: 34
End: 2019-10-01

## 2019-10-01 VITALS
OXYGEN SATURATION: 100 % | BODY MASS INDEX: 20.16 KG/M2 | RESPIRATION RATE: 12 BRPM | DIASTOLIC BLOOD PRESSURE: 77 MMHG | SYSTOLIC BLOOD PRESSURE: 125 MMHG | WEIGHT: 121 LBS | HEART RATE: 87 BPM | HEIGHT: 65 IN | TEMPERATURE: 96.8 F

## 2019-10-01 DIAGNOSIS — E78.00 HYPERCHOLESTEREMIA: ICD-10-CM

## 2019-10-01 DIAGNOSIS — E11.65 TYPE 2 DIABETES MELLITUS WITH HYPERGLYCEMIA, WITH LONG-TERM CURRENT USE OF INSULIN (HCC): Primary | ICD-10-CM

## 2019-10-01 DIAGNOSIS — I10 ESSENTIAL HYPERTENSION: ICD-10-CM

## 2019-10-01 DIAGNOSIS — Z79.4 TYPE 2 DIABETES MELLITUS WITH HYPERGLYCEMIA, WITH LONG-TERM CURRENT USE OF INSULIN (HCC): Primary | ICD-10-CM

## 2019-10-01 LAB
GLUCOSE POC: 241 MG/DL
HBA1C MFR BLD HPLC: 11.4 %

## 2019-10-01 NOTE — PROGRESS NOTES
Toña Cunningham MD          Patient Information  Date:10/1/2019  Name : Cady Lopez 26 y.o.     YOB: 1985         Referred by: Javier Shin NP         Chief Complaint   Patient presents with    Diabetes     LV 4/2018    Diabetic Foot Exam       History of Present Illness: Cady Lopez is a 26 y.o. male here for follow up of  Type 2 Diabetes Mellitus. He was off insulin, metformin, had financial issues. Started taking insulin last week. Has applied for [employer] care card  He is not checking the blood glucose  Weight has been fluctuating  Has polyuria         He has no blurring of his vision. Has a strong family history of type 2 diabetes. Wt Readings from Last 3 Encounters:   10/01/19 121 lb (54.9 kg)   08/28/18 140 lb (63.5 kg)   04/20/18 142 lb 4.8 oz (64.5 kg)       BP Readings from Last 3 Encounters:   10/01/19 125/77   08/28/18 124/88   04/20/18 142/88           Past Medical History:   Diagnosis Date    Diabetes ([facility]     Type 2 diabetes mellitus (HCC)      Current Outpatient Medications   Medication Sig    insulin NPH (NOVOLIN N NPH U-100 INSULIN) 100 unit/mL injection INJECT 30 UNITS SUBCUTANEOUSLY IN THE MORNING AND  20  UNITS  IN  THE  EVENING.  insulin regular (NOVOLIN R REGULAR U-100 INSULN) 100 unit/mL injection INJECT 8 UNITS SUBCUTANEOUSLY BEFORE BREAKFAST AND DINNER WITH SSI - MAX UNITS DAILY 53    insulin syringe-needle U-100 (BD INSULIN SYRINGE ULTRA-FINE) 1/2 mL 31 x 15/64\" syrg 1 Syringe by Does Not Apply route two (2) times a day.  pravastatin (PRAVACHOL) 10 mg tablet Take 1 Tab by mouth nightly.  lisinopril (PRINIVIL, ZESTRIL) 10 mg tablet Take 1 Tab by mouth daily.  gabapentin (NEURONTIN) 300 mg capsule Take 1 Cap by mouth nightly.  metFORMIN (GLUCOPHAGE) 1,000 mg tablet Take 1 Tab by mouth two (2) times daily (with meals).     cyclobenzaprine (FLEXERIL) 10 mg tablet Take 1 Tab by mouth nightly.  glucose tablet Take 1 Tab by mouth as needed. No current facility-administered medications for this visit. No Known Allergies      Review of Systems:  - Constitutional Symptoms: no fevers, no chills  - Eyes: no blurry vision no double vision  - Cardiovascular: no chest pain ,no palpitations  - Respiratory: no cough no shortness of breath  - Integumentary: no rashes  - Neurological: no numbness, tingling, no  headaches  -     Physical Examination:   Blood pressure 125/77, pulse 87, temperature 96.8 °F (36 °C), temperature source Oral, resp. rate 12, height 5' 5\" (1.651 m), weight 121 lb (54.9 kg), SpO2 100 %. Estimated body mass index is 20.14 kg/m² as calculated from the following:    Height as of this encounter: 5' 5\" (1.651 m). -   Weight as of this encounter: 121 lb (54.9 kg).   - General: pleasant, no distress, good eye contact  - HEENT: no pallor, no periorbital edema, EOMI  - Neck: supple, no thyromegaly  - Cardiovascular: regular, normal rate, normal S1 and S2  - Respiratory: clear to auscultation bilaterally  - Gastrointestinal: soft, nontender, nondistended,  BS +  - Musculoskeletal: :no edema,   - Neurological:alert and oriented  - Psychiatric: normal mood and affect  - Skin: color, texture, turgor normal.     Diabetic foot exam: September 2019    Left:     Vibratory sensation normal    Filament test normal sensation with micro filament   Pulse DP: 1+    Deformities: hammer toe   Right:    Vibratory sensation normal   Filament test normal sensation with micro filament   Pulse DP: 1+   Deformities: None          Lab Results   Component Value Date/Time    Hemoglobin A1c 9.5 (H) 05/03/2018 08:43 AM    Hemoglobin A1c 12.3 (H) 02/25/2015 10:26 AM    Hemoglobin A1c 12.8 (H) 05/06/2014 01:50 PM    Glucose 364 (H) 05/03/2018 08:43 AM    Glucose (POC) 184 (H) 08/23/2016 11:45 AM    Glucose  10/01/2019 08:42 AM    Microalbumin/Creat ratio (mg/g creat) 45 (H) 05/06/2014 09:30 PM    Microalb/Creat ratio (ug/mg creat.) 29.4 05/03/2018 08:43 AM    Microalbumin,urine random 2.22 05/06/2014 09:30 PM    LDL, calculated 109 (H) 05/03/2018 08:43 AM    Creatinine 0.60 (L) 05/03/2018 08:43 AM      Lab Results   Component Value Date/Time    ALT (SGPT) 30 05/03/2018 08:43 AM    AST (SGOT) 17 05/03/2018 08:43 AM    Alk. phosphatase 74 05/03/2018 08:43 AM    Bilirubin, total 0.9 05/03/2018 08:43 AM     Lab Results   Component Value Date/Time    GFR est  05/03/2018 08:43 AM    GFR est non- 05/03/2018 08:43 AM    Creatinine 0.60 (L) 05/03/2018 08:43 AM    BUN 11 05/03/2018 08:43 AM    Sodium 135 05/03/2018 08:43 AM    Potassium 4.2 05/03/2018 08:43 AM    Chloride 93 (L) 05/03/2018 08:43 AM    CO2 23 05/03/2018 08:43 AM           Assessment/Plan:     1. Type 2 Diabetes Mellitus ,uncontrolled  Lab Results   Component Value Date/Time    Hemoglobin A1c 9.5 (H) 05/03/2018 08:43 AM    Hemoglobin A1c (POC) 11.4 10/01/2019 08:42 AM     Severe hyperglycemia due to not being on medication  NPH 30 units in the morning and 20 units at bedtime  Novolin R 10 units before Breakfast and dinner,cost is playing a role  Labs today,  Advised to check glucose 2  times daily  Annual eye exam,microalbumin    2. HTN : Continue current therapy     3. 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 Smith Muller MD      Patient verbalized understanding    Voice-recognition software was used to generate this report, which may result in some phonetic-based errors in the grammar and contents. Even though attempts were made to correct all the mistakes, some may have been missed and remained in the body of the report.

## 2019-10-01 NOTE — PATIENT INSTRUCTIONS
Check blood sugars before breakfast and dinner,bedtime    If the bedtime sugars are less than 100 ,eat a 15 gm snack. Weight and diet control. Novolin N cloudy insulin 30 units in AM , 20 units at  PM    Novolin R clear insulin ( fast acting) 10 units before breakfast and dinner.      Novolin R as needed if glucose is    Blood sugar  Breakfast/Lunch/Dinner        130-200  Add  3  Units       201-250  Add  6 Units       251-300  Add  9 Units       301-350  Add 12  Units        351-400  Add 15  Units

## 2019-10-01 NOTE — LETTER
10/1/19 Patient: Jonathan Wong YOB: 1985 Date of Visit: 10/1/2019 Sameer Aviles NP 
[address] [facility] [address] [facility] [address] [address] VIA Facsimile: [phone] Dear Sameer Aviles NP, Thank you for referring Mr. Jonathan Wong to [address] [place] [address] for evaluation. My notes for this consultation are attached. If you have questions, please do not hesitate to call me. I look forward to following your patient along with you. Sincerely, Jesse Murillo MD

## 2019-10-02 DIAGNOSIS — Z79.4 TYPE 2 DIABETES MELLITUS WITH HYPERGLYCEMIA, WITH LONG-TERM CURRENT USE OF INSULIN (HCC): ICD-10-CM

## 2019-10-02 DIAGNOSIS — E11.65 TYPE 2 DIABETES MELLITUS WITH HYPERGLYCEMIA, WITH LONG-TERM CURRENT USE OF INSULIN (HCC): ICD-10-CM

## 2019-10-02 LAB
ALBUMIN/CREAT UR: 25 MG/G CREAT (ref 0–30)
BUN SERPL-MCNC: 8 MG/DL (ref 6–20)
BUN/CREAT SERPL: 16 (ref 9–20)
CALCIUM SERPL-MCNC: 9.9 MG/DL (ref 8.7–10.2)
CHLORIDE SERPL-SCNC: 94 MMOL/L (ref 96–106)
CO2 SERPL-SCNC: 24 MMOL/L (ref 20–29)
CREAT SERPL-MCNC: 0.5 MG/DL (ref 0.76–1.27)
CREAT UR-MCNC: 164.8 MG/DL
GLUCOSE SERPL-MCNC: 244 MG/DL (ref 65–99)
MICROALBUMIN UR-MCNC: 41.2 UG/ML
POTASSIUM SERPL-SCNC: 4.1 MMOL/L (ref 3.5–5.2)
SODIUM SERPL-SCNC: 139 MMOL/L (ref 134–144)

## 2019-10-02 RX ORDER — METFORMIN HYDROCHLORIDE 1000 MG/1
1000 TABLET ORAL 2 TIMES DAILY WITH MEALS
Qty: 180 TAB | Refills: 3 | Status: SHIPPED | OUTPATIENT
Start: 2019-10-02

## 2020-01-09 NOTE — PROGRESS NOTES
Nanda Rangel is a 37 y.o. male here for   Chief Complaint   Patient presents with    Diabetes       Functional glucose monitor and record keeping system? -not checking  Eye exam within last year? - due  Foot exam within last year? - on file    1. Have you been to the ER, urgent care clinic since your last visit? Hospitalized since your last visit? -no    2. Have you seen or consulted any other health care providers outside of the [address] [place] [address] since your last visit?   Include any pap smears or colon screening.-no

## 2020-01-10 ENCOUNTER — OFFICE VISIT (OUTPATIENT)
Dept: ENDOCRINOLOGY | Age: 35
End: 2020-01-10

## 2020-01-10 VITALS
DIASTOLIC BLOOD PRESSURE: 75 MMHG | BODY MASS INDEX: 23.16 KG/M2 | WEIGHT: 139 LBS | TEMPERATURE: 97.2 F | OXYGEN SATURATION: 100 % | RESPIRATION RATE: 14 BRPM | HEART RATE: 117 BPM | HEIGHT: 65 IN | SYSTOLIC BLOOD PRESSURE: 122 MMHG

## 2020-01-10 DIAGNOSIS — I10 ESSENTIAL HYPERTENSION: ICD-10-CM

## 2020-01-10 DIAGNOSIS — E78.00 HYPERCHOLESTEREMIA: ICD-10-CM

## 2020-01-10 DIAGNOSIS — E11.65 TYPE 2 DIABETES MELLITUS WITH HYPERGLYCEMIA, WITH LONG-TERM CURRENT USE OF INSULIN (HCC): Primary | ICD-10-CM

## 2020-01-10 DIAGNOSIS — Z79.4 TYPE 2 DIABETES MELLITUS WITH HYPERGLYCEMIA, WITH LONG-TERM CURRENT USE OF INSULIN (HCC): Primary | ICD-10-CM

## 2020-01-10 LAB
GLUCOSE POC: 171 MG/DL
HBA1C MFR BLD HPLC: 7.6 %

## 2020-01-10 NOTE — LETTER
1/11/20 Patient: Sarah Vale YOB: 1985 Date of Visit: 1/10/2020 Queen Johanna NP 
[address] [facility] [address] [facility] [address] [address] VIA Facsimile: [phone] Dear Queen Johanna NP, Thank you for referring Mr. Sarah Vale to [address] [place] [address] for evaluation. My notes for this consultation are attached. If you have questions, please do not hesitate to call me. I look forward to following your patient along with you. Sincerely, Sharon Bach MD

## 2020-01-10 NOTE — PROGRESS NOTES
Riya Slade MD          Patient Information  Date:1/11/2020  Name : Carmen Dubose 37 y.o.     YOB: 1985         Referred by: Pj Rivas NP         Chief Complaint   Patient presents with    Diabetes       History of Present Illness: Carmen Dubose is a 37 y.o. male here for follow up of  Type 2 Diabetes Mellitus. He was off insulin, metformin, had financial issues. Started taking insulin last week. He has applied for [facility] care card,  Not checking the blood glucose  No hypoglycemia       He has no blurring of his vision. Has a strong family history of type 2 diabetes. Wt Readings from Last 3 Encounters:   01/10/20 139 lb (63 kg)   10/01/19 121 lb (54.9 kg)   08/28/18 140 lb (63.5 kg)       BP Readings from Last 3 Encounters:   01/10/20 122/75   10/01/19 125/77   08/28/18 124/88           Past Medical History:   Diagnosis Date    [facility]     Type 2 diabetes mellitus (HCC)      Current Outpatient Medications   Medication Sig    metFORMIN (GLUCOPHAGE) 1,000 mg tablet Take 1 Tab by mouth two (2) times daily (with meals).  insulin NPH (NOVOLIN N NPH U-100 INSULIN) 100 unit/mL injection INJECT 30 UNITS SUBCUTANEOUSLY IN THE MORNING AND  20  UNITS  IN  THE  EVENING.  insulin regular (NOVOLIN R REGULAR U-100 INSULN) 100 unit/mL injection INJECT 8 UNITS SUBCUTANEOUSLY BEFORE BREAKFAST AND DINNER WITH SSI - MAX UNITS DAILY 53    pravastatin (PRAVACHOL) 10 mg tablet Take 1 Tab by mouth nightly.  lisinopril (PRINIVIL, ZESTRIL) 10 mg tablet Take 1 Tab by mouth daily.  gabapentin (NEURONTIN) 300 mg capsule Take 1 Cap by mouth nightly.  cyclobenzaprine (FLEXERIL) 10 mg tablet Take 1 Tab by mouth nightly.  insulin syringe-needle U-100 (BD INSULIN SYRINGE ULTRA-FINE) 1/2 mL 31 x 15/64\" syrg 1 Syringe by Does Not Apply route two (2) times a day.     glucose tablet Take 1 Tab by mouth as needed. No current facility-administered medications for this visit. No Known Allergies      Review of Systems:  - Constitutional Symptoms: no fevers, no chills  - Eyes: no blurry vision no double vision  - Cardiovascular: no chest pain ,no palpitations  - Respiratory: no cough no shortness of breath  - Integumentary: no rashes  - Neurological: no numbness, tingling, no  headaches  -     Physical Examination:   Blood pressure 122/75, pulse (!) 117, temperature 97.2 °F (36.2 °C), temperature source Oral, resp. rate 14, height 5' 5\" (1.651 m), weight 139 lb (63 kg), SpO2 100 %. Estimated body mass index is 23.13 kg/m² as calculated from the following:    Height as of this encounter: 5' 5\" (1.651 m). -   Weight as of this encounter: 139 lb (63 kg).   - General: pleasant, no distress, good eye contact  - HEENT: no pallor, no periorbital edema, EOMI  - Neck: supple, no thyromegaly  - Cardiovascular: regular, normal rate, normal S1 and S2  - Respiratory: clear to auscultation bilaterally  - Gastrointestinal: soft, nontender, nondistended,  BS +  - Musculoskeletal: :no edema,   - Neurological:alert and oriented  - Psychiatric: normal mood and affect  - Skin: color, texture, turgor normal.     Diabetic foot exam: September 2019    Left:     Vibratory sensation normal    Filament test normal sensation with micro filament   Pulse DP: 1+    Deformities: hammer toe   Right:    Vibratory sensation normal   Filament test normal sensation with micro filament   Pulse DP: 1+   Deformities: None          Lab Results   Component Value Date/Time    Hemoglobin A1c 9.5 (H) 05/03/2018 08:43 AM    Hemoglobin A1c 12.3 (H) 02/25/2015 10:26 AM    Hemoglobin A1c 12.8 (H) 05/06/2014 01:50 PM    Glucose 244 (H) 10/01/2019 09:14 AM    Glucose (POC) 184 (H) 08/23/2016 11:45 AM    Glucose  01/10/2020 09:54 AM    Microalbumin/Creat ratio (mg/g creat) 45 (H) 05/06/2014 09:30 PM    Microalb/Creat ratio (ug/mg creat.) 25.0 10/01/2019 09:14 AM    Microalbumin,urine random 2.22 05/06/2014 09:30 PM    LDL, calculated 109 (H) 05/03/2018 08:43 AM    Creatinine 0.50 (L) 10/01/2019 09:14 AM      Lab Results   Component Value Date/Time    ALT (SGPT) 30 05/03/2018 08:43 AM    AST (SGOT) 17 05/03/2018 08:43 AM    Alk. phosphatase 74 05/03/2018 08:43 AM    Bilirubin, total 0.9 05/03/2018 08:43 AM     Lab Results   Component Value Date/Time    GFR est  10/01/2019 09:14 AM    GFR est non- 10/01/2019 09:14 AM    Creatinine 0.50 (L) 10/01/2019 09:14 AM    BUN 8 10/01/2019 09:14 AM    Sodium 139 10/01/2019 09:14 AM    Potassium 4.1 10/01/2019 09:14 AM    Chloride 94 (L) 10/01/2019 09:14 AM    CO2 24 10/01/2019 09:14 AM           Assessment/Plan:     1. Type 2 Diabetes Mellitus ,uncontrolled  Lab Results   Component Value Date/Time    Hemoglobin A1c 9.5 (H) 05/03/2018 08:43 AM    Hemoglobin A1c (POC) 7.6 01/10/2020 09:54 AM     Improved  NPH 30 units in the morning and 20 units at bedtime  Novolin R 10 units before Breakfast and dinner,cost is playing a role  Labs today,  Advised to check glucose 2  times daily  Annual eye exam,microalbumin    2. HTN : Continue current therapy     3. 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 Susnana Da Silva MD      Patient verbalized understanding    Voice-recognition software was used to generate this report, which may result in some phonetic-based errors in the grammar and contents. Even though attempts were made to correct all the mistakes, some may have been missed and remained in the body of the report.

## 2020-07-13 ENCOUNTER — OFFICE VISIT (OUTPATIENT)
Dept: ENDOCRINOLOGY | Age: 35
End: 2020-07-13

## 2020-07-13 VITALS
SYSTOLIC BLOOD PRESSURE: 131 MMHG | RESPIRATION RATE: 14 BRPM | HEIGHT: 65 IN | WEIGHT: 131 LBS | OXYGEN SATURATION: 100 % | BODY MASS INDEX: 21.83 KG/M2 | TEMPERATURE: 98.4 F | DIASTOLIC BLOOD PRESSURE: 84 MMHG | HEART RATE: 95 BPM

## 2020-07-13 DIAGNOSIS — E78.00 HYPERCHOLESTEREMIA: ICD-10-CM

## 2020-07-13 DIAGNOSIS — Z79.4 TYPE 2 DIABETES MELLITUS WITH HYPERGLYCEMIA, WITH LONG-TERM CURRENT USE OF INSULIN (HCC): Primary | ICD-10-CM

## 2020-07-13 DIAGNOSIS — I10 ESSENTIAL HYPERTENSION: ICD-10-CM

## 2020-07-13 DIAGNOSIS — E11.65 TYPE 2 DIABETES MELLITUS WITH HYPERGLYCEMIA, WITH LONG-TERM CURRENT USE OF INSULIN (HCC): ICD-10-CM

## 2020-07-13 DIAGNOSIS — Z79.4 TYPE 2 DIABETES MELLITUS WITH HYPERGLYCEMIA, WITH LONG-TERM CURRENT USE OF INSULIN (HCC): ICD-10-CM

## 2020-07-13 DIAGNOSIS — E11.65 TYPE 2 DIABETES MELLITUS WITH HYPERGLYCEMIA, WITH LONG-TERM CURRENT USE OF INSULIN (HCC): Primary | ICD-10-CM

## 2020-07-13 LAB — HBA1C MFR BLD HPLC: 10.8 %

## 2020-07-13 NOTE — PROGRESS NOTES
Gregor Mackenzie is a 37 y.o. male here for   Chief Complaint   Patient presents with    Diabetes       1. Have you been to the ER, urgent care clinic since your last visit? Hospitalized since your last visit? -no    2. Have you seen or consulted any other health care providers outside of the [address] [place] [address] since your last visit?   Include any pap smears or colon screening.-no

## 2020-07-13 NOTE — PROGRESS NOTES
Adilene Martinez MD          Patient Information  Date:7/13/2020  Name : Williams Tomas 37 y.o.     YOB: 1985         Referred by: No primary care provider on file. Chief Complaint   Patient presents with    Diabetes       History of Present Illness: Williams Tomas is a 37 y.o. male here for follow up of  Type 2 Diabetes Mellitus. He is on basal bolus regimen  He has applied for [employer] care card,  Not checking the blood glucose  No hypoglycemia       He has no blurring of his vision. Has a strong family history of type 2 diabetes. Wt Readings from Last 3 Encounters:   07/13/20 131 lb (59.4 kg)   01/10/20 139 lb (63 kg)   10/01/19 121 lb (54.9 kg)       BP Readings from Last 3 Encounters:   07/13/20 131/84   01/10/20 122/75   10/01/19 125/77           Past Medical History:   Diagnosis Date    [facility]     Type 2 diabetes mellitus (HCC)      Current Outpatient Medications   Medication Sig    metFORMIN (GLUCOPHAGE) 1,000 mg tablet Take 1 Tab by mouth two (2) times daily (with meals).  insulin NPH (NOVOLIN N NPH U-100 INSULIN) 100 unit/mL injection INJECT 30 UNITS SUBCUTANEOUSLY IN THE MORNING AND  20  UNITS  IN  THE  EVENING.  insulin regular (NOVOLIN R REGULAR U-100 INSULN) 100 unit/mL injection INJECT 8 UNITS SUBCUTANEOUSLY BEFORE BREAKFAST AND DINNER WITH SSI - MAX UNITS DAILY 53    pravastatin (PRAVACHOL) 10 mg tablet Take 1 Tab by mouth nightly.  lisinopril (PRINIVIL, ZESTRIL) 10 mg tablet Take 1 Tab by mouth daily.  gabapentin (NEURONTIN) 300 mg capsule Take 1 Cap by mouth nightly.  cyclobenzaprine (FLEXERIL) 10 mg tablet Take 1 Tab by mouth nightly.  insulin syringe-needle U-100 (BD INSULIN SYRINGE ULTRA-FINE) 1/2 mL 31 x 15/64\" syrg 1 Syringe by Does Not Apply route two (2) times a day.  glucose tablet Take 1 Tab by mouth as needed.      No current facility-administered medications for this visit. No Known Allergies      Review of Systems:  - Constitutional Symptoms: no fevers, no chills  - Eyes: no blurry vision no double vision  - Cardiovascular: no chest pain ,no palpitations  - Respiratory: no cough no shortness of breath  - Integumentary: no rashes  - Neurological: no numbness, tingling, no  headaches  -     Physical Examination:   Blood pressure 131/84, pulse 95, temperature 98.4 °F (36.9 °C), temperature source Oral, resp. rate 14, height 5' 5\" (1.651 m), weight 131 lb (59.4 kg), SpO2 100 %. Estimated body mass index is 21.8 kg/m² as calculated from the following:    Height as of this encounter: 5' 5\" (1.651 m). -   Weight as of this encounter: 131 lb (59.4 kg).   - General: pleasant, no distress, good eye contact  - HEENT: no pallor, no periorbital edema, EOMI  - Neck: supple, no thyromegaly  - Cardiovascular: regular, normal rate, normal S1 and S2  - Respiratory: clear to auscultation bilaterally  - Gastrointestinal: soft, nontender, nondistended,  BS +  - Musculoskeletal: :no edema,   - Neurological:alert and oriented  - Psychiatric: normal mood and affect  - Skin: color, texture, turgor normal.     Diabetic foot exam: September 2019    Left:     Vibratory sensation normal    Filament test normal sensation with micro filament   Pulse DP: 1+    Deformities: hammer toe   Right:    Vibratory sensation normal   Filament test normal sensation with micro filament   Pulse DP: 1+   Deformities: None          Lab Results   Component Value Date/Time    Hemoglobin A1c 9.5 (H) 05/03/2018 08:43 AM    Hemoglobin A1c 12.3 (H) 02/25/2015 10:26 AM    Hemoglobin A1c 12.8 (H) 05/06/2014 01:50 PM    Glucose 244 (H) 10/01/2019 09:14 AM    Glucose (POC) 184 (H) 08/23/2016 11:45 AM    Glucose  01/10/2020 09:54 AM    Microalbumin/Creat ratio (mg/g creat) 45 (H) 05/06/2014 09:30 PM    Microalb/Creat ratio (ug/mg creat.) 25.0 10/01/2019 09:14 AM    Microalbumin,urine random 2.22 05/06/2014 09:30 PM    LDL, calculated 109 (H) 05/03/2018 08:43 AM    Creatinine 0.50 (L) 10/01/2019 09:14 AM      Lab Results   Component Value Date/Time    ALT (SGPT) 30 05/03/2018 08:43 AM    Alk. phosphatase 74 05/03/2018 08:43 AM    Bilirubin, total 0.9 05/03/2018 08:43 AM     Lab Results   Component Value Date/Time    GFR est  10/01/2019 09:14 AM    GFR est non- 10/01/2019 09:14 AM    Creatinine 0.50 (L) 10/01/2019 09:14 AM    BUN 8 10/01/2019 09:14 AM    Sodium 139 10/01/2019 09:14 AM    Potassium 4.1 10/01/2019 09:14 AM    Chloride 94 (L) 10/01/2019 09:14 AM    CO2 24 10/01/2019 09:14 AM           Assessment/Plan:     1. Type 2 Diabetes Mellitus ,uncontrolled  Lab Results   Component Value Date/Time    Hemoglobin A1c 9.5 (H) 05/03/2018 08:43 AM    Hemoglobin A1c (POC) 10.8 07/13/2020 11:52 AM     Uncontrolled  Need glucose data  NPH 30 units in the morning and 20 units at bedtime  Novolin R 10 units before Breakfast and dinner,cost is playing a role    Advised to check glucose 2  times daily  Annual eye exam,microalbumin    2. HTN : Continue current therapy     3. 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 Brent Hilton MD      Patient verbalized understanding    Voice-recognition software was used to generate this report, which may result in some phonetic-based errors in the grammar and contents. Even though attempts were made to correct all the mistakes, some may have been missed and remained in the body of the report.

## 2022-11-17 ENCOUNTER — APPOINTMENT (OUTPATIENT)
Dept: CT IMAGING | Age: 37
End: 2022-11-17
Attending: EMERGENCY MEDICINE

## 2022-11-17 ENCOUNTER — HOSPITAL ENCOUNTER (EMERGENCY)
Age: 37
Discharge: HOME | End: 2022-11-17
Attending: EMERGENCY MEDICINE

## 2022-11-17 VITALS
WEIGHT: 130 LBS | RESPIRATION RATE: 18 BRPM | BODY MASS INDEX: 21.66 KG/M2 | TEMPERATURE: 98.2 F | SYSTOLIC BLOOD PRESSURE: 150 MMHG | HEIGHT: 65 IN | HEART RATE: 96 BPM | OXYGEN SATURATION: 99 % | DIASTOLIC BLOOD PRESSURE: 94 MMHG

## 2022-11-17 DIAGNOSIS — F10.29 ALCOHOL DEPENDENCE WITH UNSPECIFIED ALCOHOL-INDUCED DISORDER (HCC): ICD-10-CM

## 2022-11-17 DIAGNOSIS — E86.0 DEHYDRATION: ICD-10-CM

## 2022-11-17 DIAGNOSIS — R73.9 HYPERGLYCEMIA: ICD-10-CM

## 2022-11-17 DIAGNOSIS — R79.89 LFTS ABNORMAL: ICD-10-CM

## 2022-11-17 DIAGNOSIS — R55 SYNCOPE AND COLLAPSE: Primary | ICD-10-CM

## 2022-11-17 DIAGNOSIS — S01.01XA LACERATION OF SCALP, INITIAL ENCOUNTER: ICD-10-CM

## 2022-11-17 LAB
ALBUMIN SERPL-MCNC: 4 G/DL (ref 3.5–5)
ALBUMIN/GLOB SERPL: 0.9 {RATIO} (ref 1.1–2.2)
ALP SERPL-CCNC: 127 U/L (ref 45–117)
ALT SERPL-CCNC: 107 U/L (ref 12–78)
ANION GAP SERPL CALC-SCNC: 14 MMOL/L (ref 5–15)
APPEARANCE UR: CLEAR
AST SERPL-CCNC: 93 U/L (ref 15–37)
BACTERIA URNS QL MICRO: NEGATIVE /HPF
BASOPHILS # BLD: 0.1 K/UL (ref 0–0.1)
BASOPHILS NFR BLD: 1 % (ref 0–1)
BILIRUB SERPL-MCNC: 0.5 MG/DL (ref 0.2–1)
BILIRUB UR QL: NEGATIVE
BUN SERPL-MCNC: 13 MG/DL (ref 6–20)
BUN/CREAT SERPL: 16 (ref 12–20)
CALCIUM SERPL-MCNC: 9.6 MG/DL (ref 8.5–10.1)
CHLORIDE SERPL-SCNC: 98 MMOL/L (ref 97–108)
CO2 SERPL-SCNC: 21 MMOL/L (ref 21–32)
COLOR UR: ABNORMAL
COMMENT, HOLDF: NORMAL
CREAT SERPL-MCNC: 0.8 MG/DL (ref 0.7–1.3)
DIFFERENTIAL METHOD BLD: ABNORMAL
EOSINOPHIL # BLD: 0.2 K/UL (ref 0–0.4)
EOSINOPHIL NFR BLD: 4 % (ref 0–7)
EPITH CASTS URNS QL MICRO: ABNORMAL /LPF
ERYTHROCYTE [DISTWIDTH] IN BLOOD BY AUTOMATED COUNT: 11.3 % (ref 11.5–14.5)
ETHANOL SERPL-MCNC: 69 MG/DL
GLOBULIN SER CALC-MCNC: 4.5 G/DL (ref 2–4)
GLUCOSE BLD STRIP.AUTO-MCNC: 291 MG/DL (ref 65–117)
GLUCOSE SERPL-MCNC: 386 MG/DL (ref 65–100)
GLUCOSE UR STRIP.AUTO-MCNC: >1000 MG/DL
HCT VFR BLD AUTO: 40.1 % (ref 36.6–50.3)
HGB BLD-MCNC: 14.4 G/DL (ref 12.1–17)
HGB UR QL STRIP: NEGATIVE
HYALINE CASTS URNS QL MICRO: ABNORMAL /LPF (ref 0–2)
IMM GRANULOCYTES # BLD AUTO: 0 K/UL (ref 0–0.04)
IMM GRANULOCYTES NFR BLD AUTO: 0 % (ref 0–0.5)
KETONES UR QL STRIP.AUTO: 15 MG/DL
LEUKOCYTE ESTERASE UR QL STRIP.AUTO: NEGATIVE
LYMPHOCYTES # BLD: 1 K/UL (ref 0.8–3.5)
LYMPHOCYTES NFR BLD: 18 % (ref 12–49)
MAGNESIUM SERPL-MCNC: 1.9 MG/DL (ref 1.6–2.4)
MCH RBC QN AUTO: 34.4 PG (ref 26–34)
MCHC RBC AUTO-ENTMCNC: 35.9 G/DL (ref 30–36.5)
MCV RBC AUTO: 95.9 FL (ref 80–99)
MONOCYTES # BLD: 0.4 K/UL (ref 0–1)
MONOCYTES NFR BLD: 8 % (ref 5–13)
NEUTS SEG # BLD: 3.9 K/UL (ref 1.8–8)
NEUTS SEG NFR BLD: 69 % (ref 32–75)
NITRITE UR QL STRIP.AUTO: NEGATIVE
NRBC # BLD: 0 K/UL (ref 0–0.01)
NRBC BLD-RTO: 0 PER 100 WBC
PH UR STRIP: 5.5 [PH] (ref 5–8)
PLATELET # BLD AUTO: 209 K/UL (ref 150–400)
PMV BLD AUTO: 11.3 FL (ref 8.9–12.9)
POTASSIUM SERPL-SCNC: 4.5 MMOL/L (ref 3.5–5.1)
PROT SERPL-MCNC: 8.5 G/DL (ref 6.4–8.2)
PROT UR STRIP-MCNC: NEGATIVE MG/DL
RBC # BLD AUTO: 4.18 M/UL (ref 4.1–5.7)
RBC #/AREA URNS HPF: ABNORMAL /HPF (ref 0–5)
SAMPLES BEING HELD,HOLD: NORMAL
SERVICE CMNT-IMP: ABNORMAL
SODIUM SERPL-SCNC: 133 MMOL/L (ref 136–145)
SP GR UR REFRACTOMETRY: 1.03 (ref 1–1.03)
TROPONIN-HIGH SENSITIVITY: 7 NG/L (ref 0–76)
UR CULT HOLD, URHOLD: NORMAL
UROBILINOGEN UR QL STRIP.AUTO: 0.2 EU/DL (ref 0.2–1)
WBC # BLD AUTO: 5.6 K/UL (ref 4.1–11.1)
WBC URNS QL MICRO: ABNORMAL /HPF (ref 0–4)

## 2022-11-17 PROCEDURE — 70450 CT HEAD/BRAIN W/O DYE: CPT

## 2022-11-17 PROCEDURE — 96361 HYDRATE IV INFUSION ADD-ON: CPT

## 2022-11-17 PROCEDURE — 90714 TD VACC NO PRESV 7 YRS+ IM: CPT | Performed by: EMERGENCY MEDICINE

## 2022-11-17 PROCEDURE — 99284 EMERGENCY DEPT VISIT MOD MDM: CPT

## 2022-11-17 PROCEDURE — 72125 CT NECK SPINE W/O DYE: CPT

## 2022-11-17 PROCEDURE — 74011000250 HC RX REV CODE- 250: Performed by: EMERGENCY MEDICINE

## 2022-11-17 PROCEDURE — 82077 ASSAY SPEC XCP UR&BREATH IA: CPT

## 2022-11-17 PROCEDURE — 93005 ELECTROCARDIOGRAM TRACING: CPT

## 2022-11-17 PROCEDURE — 83735 ASSAY OF MAGNESIUM: CPT

## 2022-11-17 PROCEDURE — 96372 THER/PROPH/DIAG INJ SC/IM: CPT

## 2022-11-17 PROCEDURE — 90471 IMMUNIZATION ADMIN: CPT

## 2022-11-17 PROCEDURE — 85025 COMPLETE CBC W/AUTO DIFF WBC: CPT

## 2022-11-17 PROCEDURE — 82962 GLUCOSE BLOOD TEST: CPT

## 2022-11-17 PROCEDURE — 80053 COMPREHEN METABOLIC PANEL: CPT

## 2022-11-17 PROCEDURE — 75810000293 HC SIMP/SUPERF WND  RPR

## 2022-11-17 PROCEDURE — 81001 URINALYSIS AUTO W/SCOPE: CPT

## 2022-11-17 PROCEDURE — 74011250636 HC RX REV CODE- 250/636: Performed by: EMERGENCY MEDICINE

## 2022-11-17 PROCEDURE — 84484 ASSAY OF TROPONIN QUANT: CPT

## 2022-11-17 PROCEDURE — 36415 COLL VENOUS BLD VENIPUNCTURE: CPT

## 2022-11-17 PROCEDURE — 96360 HYDRATION IV INFUSION INIT: CPT

## 2022-11-17 RX ORDER — LORAZEPAM 1 MG/1
1 TABLET ORAL
Qty: 20 TABLET | Refills: 0 | Status: SHIPPED | OUTPATIENT
Start: 2022-11-17

## 2022-11-17 RX ADMIN — LIDOCAINE HYDROCHLORIDE 100 MG: 10; .005 INJECTION, SOLUTION EPIDURAL; INFILTRATION; INTRACAUDAL; PERINEURAL at 05:46

## 2022-11-17 RX ADMIN — TETANUS AND DIPHTHERIA TOXOIDS ADSORBED 0.5 ML: 2; 2 INJECTION INTRAMUSCULAR at 04:59

## 2022-11-17 RX ADMIN — SODIUM CHLORIDE 1000 ML: 9 INJECTION, SOLUTION INTRAVENOUS at 06:09

## 2022-11-17 RX ADMIN — SODIUM CHLORIDE 1000 ML: 9 INJECTION, SOLUTION INTRAVENOUS at 04:55

## 2022-11-17 NOTE — ED TRIAGE NOTES
Pt presents to the ED with c/o headache and a laceration on the back of his head after falling while going to the bathroom this morning. Pt states that he woke up and was on the way to the bathroom when he began to feel like he was about to pass out. Pt states he then found himself on the floor with a laceration on the back of his head. Bleeding under control in Triage.

## 2022-11-17 NOTE — Clinical Note
[address]  [facility] EMERGENCY DEPT  [phone] [id number] [phone]    Work/School Note    Date: 11/17/2022    To Whom It May concern:    Hipolito Nunn was seen and treated today in the emergency room by the following provider(s):  Attending Provider: Zee Bowman MD.      Hipolito Nunn is excused from work/school on 11/17/2022 through 11/19/2022. He is medically clear to return to work/school on 11/20/2022.          Sincerely,          Artis Matias MD

## 2022-11-17 NOTE — ED PROVIDER NOTES
68-year-old male with a history of diabetes presents with a chief complaint of a fall. Patient reports that he has been getting dizzy for the last week. This morning he got up from bed to go to the bathroom and collapsed onto the floor. He did strike his head and sustained a laceration to the posterior scalp. He denies pain in any other location. He does not rate his pain on a scale. His pain is nonradiating. He is unsure when his tetanus was last updated. Patient endorses drinking heavily on a daily basis. His significant other reports that he drinks upwards of 24 beers an evening. He has apparently been doing this for the last 15+ years. Past Medical History:   Diagnosis Date    Diabetes ([facility]     Type 2 diabetes mellitus ([facility]        No past surgical history on file. Family History:   Problem Relation Age of Onset    No Known Problems Mother     No Known Problems Father        Social History     Socioeconomic History    Marital status:      Spouse name: Not on file    Number of children: Not on file    Years of education: Not on file    Highest education level: Not on file   Occupational History    Not on file   Tobacco Use    Smoking status: Never    Smokeless tobacco: Never   Substance and Sexual Activity    Alcohol use: Yes    Drug use: Not on file    Sexual activity: Not on file   Other Topics Concern    Not on file   Social History Narrative    ** Merged History Encounter **          Social Determinants of Health     Financial Resource Strain: Not on file   Food Insecurity: Not on file   Transportation Needs: Not on file   Physical Activity: Not on file   Stress: Not on file   Social Connections: Not on file   Intimate Partner Violence: Not on file   Housing Stability: Not on file         ALLERGIES: Patient has no known allergies. Review of Systems   Constitutional:  Negative for fever. HENT:  Negative for rhinorrhea. Respiratory:  Negative for cough.     Cardiovascular: Negative for chest pain. Gastrointestinal:  Negative for abdominal pain. Genitourinary:  Negative for dysuria. Musculoskeletal:  Negative for back pain. Skin:  Positive for wound. Neurological:  Negative for headaches. Psychiatric/Behavioral:  Negative for confusion. Vitals:    11/17/22 0424   BP: 104/69   Pulse: 100   Resp: 18   Temp: 98.2 °F (36.8 °C)   SpO2: 99%   Weight: 59 kg (130 lb)   Height: 5' 5\" (1.651 m)            Physical Exam  Vitals and nursing note reviewed. Constitutional:       General: He is not in acute distress. Appearance: Normal appearance. He is not ill-appearing, toxic-appearing or diaphoretic. HENT:      Head: Normocephalic. Comments: 7.5 centimeter posterior scalp laceration. Eyes:      Extraocular Movements: Extraocular movements intact. Cardiovascular:      Rate and Rhythm: Normal rate. Pulses: Normal pulses. Pulmonary:      Effort: Pulmonary effort is normal. No respiratory distress. Abdominal:      General: There is no distension. Musculoskeletal:         General: Normal range of motion. Cervical back: Normal range of motion. Skin:     General: Skin is dry. Capillary Refill: Capillary refill takes less than 2 seconds. Neurological:      Mental Status: He is alert and oriented to person, place, and time. Psychiatric:         Mood and Affect: Mood normal.        MDM  Number of Diagnoses or Management Options  Syncope and collapse  Diagnosis management comments:     Patient presents after syncopal episode. He has a large laceration of the back of his head which was repaired per procedure note. CT imaging obtained to rule out intracranial hemorrhage, fracture of the skull or cervical spine. Imaging unremarkable. Labs show normal hemoglobin, mildly elevated liver enzymes, normal troponin and magnesium. Patient's orthostatic blood pressures were significantly low with pressures dropping into the 77H systolic with standing. Patient given IV fluid hydration. Patient will be signed out to night attending pending hydration and repeat orthostatic blood pressures. 7 AM  Change of shift. Care of patient signed over to Dr. Maximino Ruiz. Bedside handoff complete. Awaiting repeat orthos. Total critical care time spent exclusive of procedures:  37 minutes            ED Course as of 11/17/22 0545   u Nov 17, 2022   0455 EKG shows sinus rhythm at a rate of 92, normal intervals, normal axis, no ischemic changes. [RD]      ED Course User Index  [RD] Anabell Carlin MD       Wound Repair    Date/Time: 11/17/2022 5:45 AM  Performed by: attendingLocation details: scalp  Wound length:2.6 - 7.5 cm  Anesthesia: local infiltration    Anesthesia:  Local Anesthetic: lidocaine 1% with epinephrine  Foreign bodies: no foreign bodies  Skin closure: staples  Number of sutures: 9  Approximation: close  Patient tolerance: patient tolerated the procedure well with no immediate complications  My total time at bedside, performing this procedure was 1-15 minutes.

## 2022-11-17 NOTE — ED NOTES
DC paperwork reviewed, Pt verbalized back understanding, discussed follow up for staple removal. IV removed. No distress noted at time of DC.

## 2022-11-18 LAB
ATRIAL RATE: 92 BPM
CALCULATED P AXIS, ECG09: 48 DEGREES
CALCULATED R AXIS, ECG10: -4 DEGREES
CALCULATED T AXIS, ECG11: 27 DEGREES
DIAGNOSIS, 93000: NORMAL
P-R INTERVAL, ECG05: 154 MS
Q-T INTERVAL, ECG07: 376 MS
QRS DURATION, ECG06: 74 MS
QTC CALCULATION (BEZET), ECG08: 464 MS
VENTRICULAR RATE, ECG03: 92 BPM

## 2022-11-28 ENCOUNTER — HOSPITAL ENCOUNTER (EMERGENCY)
Age: 37
Discharge: HOME | End: 2022-11-28
Attending: STUDENT IN AN ORGANIZED HEALTH CARE EDUCATION/TRAINING PROGRAM

## 2022-11-28 VITALS
WEIGHT: 130 LBS | BODY MASS INDEX: 21.66 KG/M2 | RESPIRATION RATE: 18 BRPM | TEMPERATURE: 98.2 F | HEIGHT: 65 IN | SYSTOLIC BLOOD PRESSURE: 103 MMHG | OXYGEN SATURATION: 97 % | HEART RATE: 108 BPM | DIASTOLIC BLOOD PRESSURE: 68 MMHG

## 2022-11-28 DIAGNOSIS — Z48.02 ENCOUNTER FOR STAPLE REMOVAL: Primary | ICD-10-CM

## 2022-11-28 PROCEDURE — 75810000275 HC EMERGENCY DEPT VISIT NO LEVEL OF CARE

## 2022-11-28 RX ORDER — BACITRACIN 500 [USP'U]/G
OINTMENT TOPICAL 3 TIMES DAILY
Qty: 1 EACH | Refills: 0 | Status: SHIPPED | OUTPATIENT
Start: 2022-11-28 | End: 2022-12-08

## 2022-11-28 NOTE — DISCHARGE INSTRUCTIONS
You may apply bacitracin up to 3 times daily  to help with healing. Return to the ER with worsening of symptoms, fever, chills. [facility] SYSTEMS Departments     For adult and child immunizations, family planning, TB screening, STD testing and women's health services. [facility]: [place] [phone]      [place] Michelle RAMIREZ 25   [address]   [address]   170 [facility]: [place] Chand 200 [facility] Street  [phone]      [address] [facility]          [address] [address]     For primary care services, woman and child wellness, and some clinics providing specialty care. [facility] -- [facility]. [address] [place] [address] [phone]   [address] [place] [address] 200 [facility] [address] [place] [address] [phone]   [address] [facility] [address]  [phone] [address]  [place] [address] [place] [address] 5850 [facility]  [phone]   [address] [place] [address] [phone]   [facility] 81 [facility] [phone]   Darrian [address] [phone]   [facility]: [facility] artur Kate [address] [place] [address], [address] [phone]     [address] [phone]   [facility] 58[address] [facility]  [phone]   [employer]  [address], [address] ([email or web address]) [phone]         Sexual Health/Woman Wellness Clinics    For STD/HIV testing and treatment, pregnancy testing and services, men's health, birth control services, LGBT services, and hepatitis/HPV vaccine services. ARCADIO ARCINIEGA [facility] [employer] [address] [facility] [address] [facility] [address] [facility] 157 [address] [place] [phone]   [address] [place] [address] Rd, 5th floor [phone]   Pregnancy hospitals of 59 [facility] for Women [address]  [place] [phone]         [place] [address] [facility] [address] [place] [address]   [phone]   [facility]   [phone]   Women, Infant and Children's Services: [address] [phone] 600 [facility]   [phone]   [address]   [facility]     [phone]   [address] Crisis   1212 [facility] [phone]       Local Primary Care Physicians  [facility] [phone]  MD Jonnathan Hannah MD Seward Alas, MD [facility] Doctors [phone]  Linda Regalado, P  MD Amina Olsen MD Marnell Dirks, MD [address] [address] [phone]  [place] [address] [facility] [phone]  MD Tori George MD Miriam Minus, MD Lawerance Ida, MD   [facility] [phone]  YIYM LRYBKT JOBY, MD Rachel Souza Che, NP [address] [phone]  MD Barry Dupree MD Kinnie Pummel, MD Eveleen Polo, MD Kirke Puna, MD Hadassah Semen, MD Cranford Alexander, MD   3 [facility]  Mariama Horton MD [address] [phone]  MD Betty Hunter, NP  Zeina Mayo, MD Elma Cota, MD Tania Cedeno MD   [address] [facility] [phone]  MD Alison Louie, FNP  Beny Allen, THANH Gonzales, MD Mayela Ellison, MD Cathy Daly MD [facility] [phone]  MD Sandra Simpson MD Charletta Sellar, MD Sena Sequin, MD Sherin George, MD   [facility] [phone]  MD Marcelino Ramos MD [place] [phone]  MD Oksana Thomas, MD Zoltan Montero MD   [facility] [phone]  Ryan Navarrete, MD Cherise Harrington, MD Lazarus Pickle, MD Nidia Oppenheim, MD Javier Augustin, MD Julia Tyson, THANH Boucher, MD [facility]   [phone]  Marco Antonio Dempsey, MD Radha Greenwood, MD Bia Blevisn MD   2106 [facility] [phone]  [address] [place] [address] MD Gautam Chi, RAYMON Forman, ZENAIDA Forman, ZENAIDA Calderon MD Daved Byars, THANH Herrera, DO Miscellaneous:  Latoya Cummings [phone]

## 2022-11-28 NOTE — ED PROVIDER NOTES
68-year-old male with past medical history of type 2 diabetes presents ambulatory for staple removal.  Patient states that he had been placed 10 days ago, denies any fever, chills, abnormal drainage or pain. Staples are in the posterior aspect of the head, 9. No other complaints today. Past Medical History:   Diagnosis Date    Diabetes ([facility]     Type 2 diabetes mellitus ([facility]        No past surgical history on file. Family History:   Problem Relation Age of Onset    No Known Problems Mother     No Known Problems Father        Social History     Socioeconomic History    Marital status:      Spouse name: Not on file    Number of children: Not on file    Years of education: Not on file    Highest education level: Not on file   Occupational History    Not on file   Tobacco Use    Smoking status: Never    Smokeless tobacco: Never   Substance and Sexual Activity    Alcohol use: Yes    Drug use: Not on file    Sexual activity: Not on file   Other Topics Concern    Not on file   Social History Narrative    ** Merged History Encounter **          Social Determinants of Health     Financial Resource Strain: Not on file   Food Insecurity: Not on file   Transportation Needs: Not on file   Physical Activity: Not on file   Stress: Not on file   Social Connections: Not on file   Intimate Partner Violence: Not on file   Housing Stability: Not on file         ALLERGIES: Patient has no known allergies. Review of Systems   Constitutional:  Negative for chills and fever. Respiratory: Negative. Cardiovascular: Negative. Genitourinary: Negative. Skin:  Positive for wound. 9 Staples to be removed from the posterior scalp. Neurological: Negative. Psychiatric/Behavioral: Negative.        Vitals:    11/28/22 0807 11/28/22 0808   BP:  (!) 104/54   Pulse:  (!) 109   Resp:  18   Temp:  98.4 °F (36.9 °C)   SpO2:  96%   Weight: 59 kg (130 lb)    Height: 5' 5\" (1.651 m)             Physical Exam  Vitals and nursing note reviewed. Constitutional:       Appearance: Normal appearance. HENT:      Head: Normocephalic. Nose: Nose normal.   Cardiovascular:      Rate and Rhythm: Normal rate. Pulmonary:      Effort: Pulmonary effort is normal. No respiratory distress. Abdominal:      General: There is no distension. Musculoskeletal:         General: Normal range of motion. Cervical back: Normal range of motion. Skin:     General: Skin is dry. Findings: Laceration present. Comments: 9 staples, well approximated, no abnormal drainage, erythema or warmth. Slightly scabbed over. Neurological:      Mental Status: He is alert and oriented to person, place, and time.    Psychiatric:         Mood and Affect: Mood normal.        MDM  Number of Diagnoses or Management Options  Encounter for staple removal: minor  Diagnosis management comments: Differential DX: wound dehiscence vs staple removal    Risk of Complications, Morbidity, and/or Mortality  Presenting problems: minimal  Diagnostic procedures: minimal  Management options: low    Patient Progress  Patient progress: improved         Suture/Staple Removal    Date/Time: 11/28/2022 8:31 AM  Performed by: Jenny Alcala NP  Authorized by: Jenny Alcala NP     Consent:     Consent obtained:  Verbal    Consent given by:  Patient    Risks, benefits, and alternatives were discussed: yes      Risks discussed:  Bleeding, pain and wound separation    Alternatives discussed:  No treatment, delayed treatment and alternative treatment  Universal protocol:     Site/side marked: yes      Immediately prior to procedure, a time out was called: yes      Patient identity confirmed:  Verbally with patient  Location:     Location:  [facility] [facility] location:  Scalp  Procedure details:     Number of staples removed:  9  Post-procedure details:     Post-removal:  No dressing applied    Procedure completion:  Tolerated  Comments: Site cleansed with Shur-Clens, in the middle of his hair posterior mid scalp. Discussed applying bacitracin up to 3-4 times a day, and strict return precautions for any signs or symptoms of infection. VITAL SIGNS:  Patient Vitals for the past 4 hrs:   Temp Pulse Resp BP SpO2   11/28/22 0808 98.4 °F (36.9 °C) (!) 109 18 (!) 104/54 96 %         LABS:  No results found for this or any previous visit (from the past 6 hour(s)). IMAGING:  No orders to display         Medications During Visit:  Medications - No data to display      DECISION MAKING:  Johnnie Ruiz is a 42 y.o. male who comes in as above. Patient is well-appearing, stable vital signs, tachycardic will have RN reassess, patient with no complaints of feeling ill, states that he had staples placed to the back of his head 10 days ago and is here for removal.  Denies fever, chills, abnormal drainage, pain, confusion or discomfort. Discussed staple removal.  And precautions provided, clinic information provided to establish care with PCP, will send Rx for bacitracin to apply up to 3-4 times daily to assist with wound healing, site cleansed post staple remover, no dehiscence, no erythema, no abnormal drainage, patient tolerated without difficulty. IMPRESSION:  1. Encounter for staple removal        DISPOSITION:  Discharged      Current Discharge Medication List        START taking these medications    Details   bacitracin (BACITRACIN) 500 unit/gram oint Apply  to affected area three (3) times daily for 10 days. Apply to affected area  Qty: 1 Each, Refills: 0  Start date: 11/28/2022, End date: 12/8/2022              Follow-up Information       Follow up With Specialties Details Why Contact Info    Establish care with Primary Care for routine care.         [facility] EMERGENCY DEPT Emergency Medicine  If symptoms worsen [facility]  [phone]              The patient is asked to follow-up with their primary care provider in the next several days. They are to call tomorrow for an appointment. The patient is asked to return promptly for any increased concerns or worsening of symptoms. They can return to this emergency department or any other emergency department.   Jenny Randle NP  8:31 AM

## 2023-04-03 NOTE — DISCHARGE INSTRUCTIONS
Please get help to stop drinking. Drink plenty of water and return for any concern. The staples can be removed in 10 days. Keep wound dry for 2 days.  Apply a thin layer of bacitracin twice a day until healed unknown

## 2024-08-25 ENCOUNTER — HOSPITAL ENCOUNTER (EMERGENCY)
Facility: HOSPITAL | Age: 39
Discharge: HOME | End: 2024-08-25
Attending: EMERGENCY MEDICINE

## 2024-08-25 VITALS
OXYGEN SATURATION: 99 % | HEIGHT: 65 IN | BODY MASS INDEX: 23.03 KG/M2 | RESPIRATION RATE: 22 BRPM | DIASTOLIC BLOOD PRESSURE: 94 MMHG | HEART RATE: 99 BPM | SYSTOLIC BLOOD PRESSURE: 147 MMHG | TEMPERATURE: 98.7 F | WEIGHT: 138.23 LBS

## 2024-08-25 DIAGNOSIS — K04.7 DENTAL INFECTION: Primary | ICD-10-CM

## 2024-08-25 DIAGNOSIS — R22.0 FACIAL SWELLING: ICD-10-CM

## 2024-08-25 PROCEDURE — 94761 N-INVAS EAR/PLS OXIMETRY MLT: CPT

## 2024-08-25 PROCEDURE — 99283 EMERGENCY DEPT VISIT LOW MDM: CPT

## 2024-08-25 PROCEDURE — 6370000000 HC RX 637 (ALT 250 FOR IP): Performed by: NURSE PRACTITIONER

## 2024-08-25 RX ORDER — IBUPROFEN 600 MG/1
600 TABLET, FILM COATED ORAL EVERY 6 HOURS PRN
Qty: 20 TABLET | Refills: 0 | Status: SHIPPED | OUTPATIENT
Start: 2024-08-25

## 2024-08-25 RX ORDER — METHYLPREDNISOLONE 4 MG
TABLET, DOSE PACK ORAL
Qty: 1 KIT | Refills: 0 | Status: SHIPPED | OUTPATIENT
Start: 2024-08-25

## 2024-08-25 RX ORDER — CLINDAMYCIN HCL 150 MG
300 CAPSULE ORAL ONCE
Status: COMPLETED | OUTPATIENT
Start: 2024-08-25 | End: 2024-08-25

## 2024-08-25 RX ORDER — CLINDAMYCIN HCL 300 MG
300 CAPSULE ORAL 2 TIMES DAILY
Qty: 14 CAPSULE | Refills: 0 | Status: SHIPPED | OUTPATIENT
Start: 2024-08-25 | End: 2024-09-01

## 2024-08-25 RX ADMIN — CLINDAMYCIN HYDROCHLORIDE 300 MG: 150 CAPSULE ORAL at 10:10

## 2024-08-25 RX ADMIN — PREDNISONE 50 MG: 20 TABLET ORAL at 10:10

## 2024-08-25 ASSESSMENT — PAIN - FUNCTIONAL ASSESSMENT: PAIN_FUNCTIONAL_ASSESSMENT: 0-10

## 2024-08-25 ASSESSMENT — PAIN DESCRIPTION - LOCATION: LOCATION: FACE

## 2024-08-25 ASSESSMENT — PAIN DESCRIPTION - ORIENTATION: ORIENTATION: RIGHT

## 2024-08-25 ASSESSMENT — PAIN SCALES - GENERAL: PAINLEVEL_OUTOF10: 9

## 2024-08-25 NOTE — ED TRIAGE NOTES
Patient arrives to the ED via POV with complaints of swelling to R side of face that started Friday. Patient believes swelling started after eating chips from vending machine on Thursday.     Denies difficulty breathing or swallowing.

## 2024-08-25 NOTE — ED PROVIDER NOTES
nursing note reviewed.   Constitutional:       Appearance: Normal appearance. He is not ill-appearing.   HENT:      Head: Normocephalic and atraumatic.      Comments: Right sided facial swelling with an abscessed molar at tooth number 2     Right Ear: External ear normal.      Left Ear: External ear normal.      Nose: Nose normal. No congestion.      Mouth/Throat:      Mouth: Mucous membranes are moist.   Eyes:      General:         Right eye: No discharge.         Left eye: No discharge.      Conjunctiva/sclera: Conjunctivae normal.      Pupils: Pupils are equal, round, and reactive to light.   Neck:      Comments: Full range of motion  Cardiovascular:      Rate and Rhythm: Normal rate and regular rhythm.   Pulmonary:      Effort: Pulmonary effort is normal. No respiratory distress.   Chest:      Chest wall: No tenderness.   Abdominal:      General: There is no distension.      Tenderness: There is no guarding.   Musculoskeletal:         General: No swelling or tenderness. Normal range of motion.      Cervical back: Normal range of motion and neck supple. No tenderness.   Lymphadenopathy:      Cervical: No cervical adenopathy.   Skin:     General: Skin is warm and dry.      Capillary Refill: Capillary refill takes less than 2 seconds.   Neurological:      General: No focal deficit present.      Mental Status: He is alert and oriented to person, place, and time.      Sensory: No sensory deficit.      Motor: No weakness.   Psychiatric:         Mood and Affect: Mood normal.         Behavior: Behavior normal.         Thought Content: Thought content normal.         Judgment: Judgment normal.             EMERGENCY DEPARTMENT COURSE and DIFFERENTIAL DIAGNOSIS/MDM:   Vitals:    Vitals:    08/25/24 0908 08/25/24 0912   BP: (!) 147/94    Pulse:  99   Resp: 22    Temp: 98.7 °F (37.1 °C)    SpO2: 99%    Weight: 62.7 kg (138 lb 3.7 oz)    Height: 1.651 m (5' 5\")          Medical Decision Making  Differential diagnosis includes  tooth abscess, facial swelling, tooth fracture and others.    Assessment and Plan:  Unilateral facial swelling, right cheek, with recent history of consuming snack from a vending machine. Possible causes include allergic reaction, localized infection, or benign cyst formation. No systemic symptoms to suggest an immediate severe condition.    No indication for imaging or lab data. Diagnosis made on physical examination. Symptomatic treatment with  prescription strength motrin. Antibiotics as well as steroids. Patient is a diabetic and will monitor his blood sugars. Follow up with [facility] for definitive treatment ASAP. Phone number supplied. Return to  the emergency room with worsening symptoms. Patient in agreement with plan of care.     Any available vitals, labs, images, nursing notes, medications, allergies, PMH, PSH and/or previous records in the chart were reviewed. All of these were considered in the medical decision making process. I individually reviewed any labs and any images obtained. This was discussed with my attending and he/she is in agreement with plan of care.       Problems Addressed:  Dental infection: acute illness or injury  Facial swelling: acute illness or injury    Risk  Prescription drug management.        REASSESSMENT        CONSULTS:  None    PROCEDURES:     Procedures    Labs Reviewed - No data to display    No orders to display       10:02 AM  Pt has been reexamined.  Pt has no new complaints, changes or physical findings. Care plan outlined and precautions discussed. All available results were reviewed with pt. All medications were reviewed with pt. All of pt's questions and concerns were addressed. Pt agrees to F/U as instructed and agrees to return to ED upon further deterioration. Pt is ready to go home.  SUSAN Steve NP      (Please note that portions of this note were completed with a voice recognition program.  Efforts were made to edit the dictations but